# Patient Record
Sex: FEMALE | Race: OTHER | HISPANIC OR LATINO
[De-identification: names, ages, dates, MRNs, and addresses within clinical notes are randomized per-mention and may not be internally consistent; named-entity substitution may affect disease eponyms.]

---

## 2017-01-27 ENCOUNTER — MEDICATION RENEWAL (OUTPATIENT)
Age: 52
End: 2017-01-27

## 2017-01-27 DIAGNOSIS — M54.5 LOW BACK PAIN: ICD-10-CM

## 2017-11-13 ENCOUNTER — APPOINTMENT (OUTPATIENT)
Dept: INTERNAL MEDICINE | Facility: CLINIC | Age: 52
End: 2017-11-13
Payer: COMMERCIAL

## 2017-11-13 PROCEDURE — 99396 PREV VISIT EST AGE 40-64: CPT

## 2018-04-30 ENCOUNTER — APPOINTMENT (OUTPATIENT)
Dept: INTERNAL MEDICINE | Facility: CLINIC | Age: 53
End: 2018-04-30
Payer: COMMERCIAL

## 2018-04-30 DIAGNOSIS — M77.11 LATERAL EPICONDYLITIS, RIGHT ELBOW: ICD-10-CM

## 2018-04-30 PROCEDURE — 99214 OFFICE O/P EST MOD 30 MIN: CPT

## 2018-10-12 ENCOUNTER — APPOINTMENT (OUTPATIENT)
Dept: INTERNAL MEDICINE | Facility: CLINIC | Age: 53
End: 2018-10-12
Payer: SELF-PAY

## 2018-10-12 VITALS — SYSTOLIC BLOOD PRESSURE: 150 MMHG | DIASTOLIC BLOOD PRESSURE: 90 MMHG

## 2018-10-12 VITALS
HEART RATE: 80 BPM | OXYGEN SATURATION: 98 % | WEIGHT: 150 LBS | TEMPERATURE: 98 F | SYSTOLIC BLOOD PRESSURE: 166 MMHG | RESPIRATION RATE: 14 BRPM | DIASTOLIC BLOOD PRESSURE: 92 MMHG

## 2018-10-12 PROCEDURE — 99214 OFFICE O/P EST MOD 30 MIN: CPT

## 2018-10-12 NOTE — HISTORY OF PRESENT ILLNESS
[FreeTextEntry8] : PT HAS BEEN HAVING POSITIONAL VERTIGO FOR A FEW WEEKS WORSE WITH SUDDEN HEAD MOVEMENTS\par ALSO HAS INESSA EMOTIONAL AND HAVING EPISODES OF SOB,PALPATIONS, AND CRYING AFTER CLOSE NEIGHBOR  SUDDENLY 1 M AGO  AND WORSE LAST 7- 10 DAYS;LAST PRAVIN GOT WORSE AND WENT TO ER AT [facility] AFTER HER BP AT HOME /80;WHILE THERE HER BP NORMALIZE,EKG AND CT HEAD WERE NORMAL AND D/C WITH HCTZ 12.5 MG DAILY\par TODAY FEELING TIRED,ANXIOUS,PALPITATIONS ON/OFF AND VERTIGINOUS SENSATION WHEN MOVING HER HEAD

## 2018-10-12 NOTE — PHYSICAL EXAM
[No Acute Distress] : no acute distress [Well Nourished] : well nourished [Well Developed] : well developed [Well-Appearing] : well-appearing [Normal Sclera/Conjunctiva] : normal sclera/conjunctiva [PERRL] : pupils equal round and reactive to light [EOMI] : extraocular movements intact [Normal Outer Ear/Nose] : the outer ears and nose were normal in appearance [Normal Oropharynx] : the oropharynx was normal [No JVD] : no jugular venous distention [Supple] : supple [No Lymphadenopathy] : no lymphadenopathy [Thyroid Normal, No Nodules] : the thyroid was normal and there were no nodules present [No Respiratory Distress] : no respiratory distress  [Clear to Auscultation] : lungs were clear to auscultation bilaterally [No Accessory Muscle Use] : no accessory muscle use [Regular Rhythm] : with a regular rhythm [Normal S1, S2] : normal S1 and S2 [No Murmur] : no murmur heard [No Carotid Bruits] : no carotid bruits [No Abdominal Bruit] : a ~M bruit was not heard ~T in the abdomen [No Varicosities] : no varicosities [Pedal Pulses Present] : the pedal pulses are present [No Edema] : there was no peripheral edema [No Extremity Clubbing/Cyanosis] : no extremity clubbing/cyanosis [No Palpable Aorta] : no palpable aorta [Soft] : abdomen soft [Non Tender] : non-tender [Non-distended] : non-distended [No Masses] : no abdominal mass palpated [No HSM] : no HSM [Normal Bowel Sounds] : normal bowel sounds [Normal Posterior Cervical Nodes] : no posterior cervical lymphadenopathy [Normal Anterior Cervical Nodes] : no anterior cervical lymphadenopathy [No CVA Tenderness] : no CVA  tenderness [No Spinal Tenderness] : no spinal tenderness [No Joint Swelling] : no joint swelling [Grossly Normal Strength/Tone] : grossly normal strength/tone [No Rash] : no rash [Normal Gait] : normal gait [Coordination Grossly Intact] : coordination grossly intact [No Focal Deficits] : no focal deficits [Deep Tendon Reflexes (DTR)] : deep tendon reflexes were 2+ and symmetric [Normal Affect] : the affect was normal [Normal Insight/Judgement] : insight and judgment were intact [Normal Mental Status] : the patient's orientation, memory, attention, language and fund of knowledge were normal [Person] : oriented to person [Place] : oriented to place [Time] : oriented to time [Short Term Intact] : short term memory intact [Span Intact] : the attention span was normal [Concentration Intact] : normal concentrating ability [Naming Objects] : no difficulty naming common objects [Current Events] : adequate knowledge of current events [Appropriate] : appropriate [Anxious] : anxious [Depressed] : depressed [Labile] : labile [Impaired judgment] : intact judgment [Impaired Insight] : intact insight [Normal Rate] : a normal rate [Normal Rhythm] : a normal rhythm [Normal Tone] : normal tone [Normal Volume] : normal volume [Normal] : normal throught processes [Delusions] : exhibited no delusions [Hallucinations] : exhibited no hallucinations [Obsessions] : denied obsessions [Preoccupation with Violence] : denied preoccupation with violent thoughts [Suicidal Ideation] : denied suicidal ideation [Suicidal Intent] : denied suicidal intent [Suicidal Plan] : denied suicidal plans [Homicidal Ideation] : denied homicidal ideation [Homicidal Intent] : denied homicidal intention [Homicidal Plan] : denied homicidal plans

## 2018-10-12 NOTE — REVIEW OF SYSTEMS
[Palpitations] : palpitations [Shortness Of Breath] : shortness of breath [Dizziness] : dizziness [Anxiety] : anxiety [Depression] : depression [Negative] : Heme/Lymph

## 2018-10-12 NOTE — ASSESSMENT
[FreeTextEntry1] : ACUTE VISIT OF 53 Y OLD FEM WHO HAS BEEN HAVING BPPV FOR SEVERAL WEEKS= MECLIZINE 25 MG BID PRN RX SENT,EXPLAINED NATURE OF DISEASE\par DONALDO WITH PALPATIONS= LEXAPRO 10MG DAILY TILL NEXT VISIT ,EXPLAINED IN DETAIL\par PANIC ATTACKS= CLONAZEPAM 0.5 MG EVERY 8 HS PRN ONLY WHILE LEXAPRO WORKS\par HTNSTAGE 1/PALPITATIONS= METOPROLOL 25 MG EVERY 12 HS PRN\par RTO 1-2 WEEKS OR PRN \par TIME OF DIRECT PT CARE 42 MINUTES\par RECORDS FROM HOSPITAL REVIEWED  AND DISCUSSED.\par  PRESENT DURING EXAM

## 2018-11-08 ENCOUNTER — RX RENEWAL (OUTPATIENT)
Age: 53
End: 2018-11-08

## 2018-11-28 ENCOUNTER — APPOINTMENT (OUTPATIENT)
Dept: INTERNAL MEDICINE | Facility: CLINIC | Age: 53
End: 2018-11-28
Payer: COMMERCIAL

## 2018-11-28 ENCOUNTER — NON-APPOINTMENT (OUTPATIENT)
Age: 53
End: 2018-11-28

## 2018-11-28 VITALS — DIASTOLIC BLOOD PRESSURE: 90 MMHG | SYSTOLIC BLOOD PRESSURE: 142 MMHG

## 2018-11-28 VITALS
OXYGEN SATURATION: 96 % | BODY MASS INDEX: 25.61 KG/M2 | WEIGHT: 150 LBS | SYSTOLIC BLOOD PRESSURE: 159 MMHG | HEART RATE: 72 BPM | HEIGHT: 64 IN | DIASTOLIC BLOOD PRESSURE: 89 MMHG | TEMPERATURE: 98 F

## 2018-11-28 PROCEDURE — 93000 ELECTROCARDIOGRAM COMPLETE: CPT

## 2018-11-28 PROCEDURE — 99396 PREV VISIT EST AGE 40-64: CPT | Mod: 25

## 2018-11-28 PROCEDURE — 90686 IIV4 VACC NO PRSV 0.5 ML IM: CPT

## 2018-11-28 PROCEDURE — 90471 IMMUNIZATION ADMIN: CPT

## 2018-11-28 RX ORDER — METOPROLOL TARTRATE 25 MG/1
25 TABLET, FILM COATED ORAL
Qty: 60 | Refills: 0 | Status: DISCONTINUED | COMMUNITY
Start: 2018-10-12 | End: 2018-11-28

## 2018-11-28 RX ORDER — CYCLOBENZAPRINE HYDROCHLORIDE 10 MG/1
10 TABLET, FILM COATED ORAL
Qty: 60 | Refills: 0 | Status: DISCONTINUED | COMMUNITY
Start: 2017-01-27 | End: 2018-11-28

## 2018-11-28 NOTE — ASSESSMENT
[FreeTextEntry1] : CPE OF 53 Y OLD FEM WITH PMX OF HTN STAGE 1,DONALDO,IFG= LABS REVIEWED,CONTINUE LEXAPRO 10 MG DAILY;TAKING KLONOPIN RARELY PRN\par INFLUENZA VACCINE TODAY\par HTN=DIET AND EXERCISE RTO 2 M

## 2019-01-06 ENCOUNTER — RX RENEWAL (OUTPATIENT)
Age: 54
End: 2019-01-06

## 2019-01-07 ENCOUNTER — APPOINTMENT (OUTPATIENT)
Dept: INTERNAL MEDICINE | Facility: CLINIC | Age: 54
End: 2019-01-07
Payer: COMMERCIAL

## 2019-01-07 VITALS
DIASTOLIC BLOOD PRESSURE: 78 MMHG | TEMPERATURE: 98.1 F | BODY MASS INDEX: 25.61 KG/M2 | WEIGHT: 150 LBS | HEIGHT: 64 IN | HEART RATE: 70 BPM | OXYGEN SATURATION: 95 % | SYSTOLIC BLOOD PRESSURE: 136 MMHG

## 2019-01-07 DIAGNOSIS — J01.10 ACUTE FRONTAL SINUSITIS, UNSPECIFIED: ICD-10-CM

## 2019-01-07 DIAGNOSIS — R20.2 PARESTHESIA OF SKIN: ICD-10-CM

## 2019-01-07 PROCEDURE — 99214 OFFICE O/P EST MOD 30 MIN: CPT

## 2019-01-07 NOTE — PHYSICAL EXAM

## 2019-01-07 NOTE — REVIEW OF SYSTEMS
[Nasal Discharge] : nasal discharge [Postnasal Drip] : postnasal drip [Palpitations] : palpitations [Shortness Of Breath] : shortness of breath [Cough] : cough [Anxiety] : anxiety [Negative] : Heme/Lymph [de-identified] : PARESTHESIA OF PLANTAR AREA KENNEDI

## 2019-01-07 NOTE — HISTORY OF PRESENT ILLNESS
[FreeTextEntry8] : PT DEVELOPED URI 1  AGO ,LASTED 3 DAYS AND RESOLVED\par 2-3 WEEKS LATER DEVELOPED PRODUCTIVE COUGH,BECAME PURULENT ,DEVELOPED HA AND SINUS PRESSURE,WENT TO URGENT CARE 4 DASY AGO,HAD NEG CXR AND INFLUENZA POCT TESTING AND RX VENTOLIN ,TESSALON  AND AMOX 875 \par COUGH IS BETTER BUT PURULENT SPUTUM AND PND IS UNCHANGED .\par PT STOPPED LEXAPRO WHEN SHE STARED ATB AND ANXIETY SX RETURNED\par PT HAS BEEN HAVING SENSATION OF WETNESS -SWEATING OF  PLANTAR ASPECT OF BOTH FEET FOR OVER 1 M BUT THEY ARE NORMAL TO TOUCH

## 2019-03-15 ENCOUNTER — RX RENEWAL (OUTPATIENT)
Age: 54
End: 2019-03-15

## 2019-05-27 ENCOUNTER — RX RENEWAL (OUTPATIENT)
Age: 54
End: 2019-05-27

## 2019-06-26 ENCOUNTER — APPOINTMENT (OUTPATIENT)
Dept: INTERNAL MEDICINE | Facility: CLINIC | Age: 54
End: 2019-06-26
Payer: COMMERCIAL

## 2019-06-26 VITALS
DIASTOLIC BLOOD PRESSURE: 77 MMHG | WEIGHT: 150 LBS | SYSTOLIC BLOOD PRESSURE: 150 MMHG | BODY MASS INDEX: 25.61 KG/M2 | TEMPERATURE: 98.4 F | RESPIRATION RATE: 14 BRPM | HEIGHT: 64 IN | OXYGEN SATURATION: 98 % | HEART RATE: 68 BPM

## 2019-06-26 VITALS — SYSTOLIC BLOOD PRESSURE: 130 MMHG | DIASTOLIC BLOOD PRESSURE: 80 MMHG

## 2019-06-26 PROCEDURE — 99214 OFFICE O/P EST MOD 30 MIN: CPT

## 2019-06-26 RX ORDER — LEVOFLOXACIN 500 MG/1
500 TABLET, FILM COATED ORAL
Qty: 10 | Refills: 0 | Status: DISCONTINUED | COMMUNITY
Start: 2019-01-07 | End: 2019-06-26

## 2019-06-26 NOTE — REVIEW OF SYSTEMS
[Abdominal Pain] : abdominal pain [Heartburn] : heartburn [Negative] : Heme/Lymph [FreeTextEntry4] : SEE HPI [FreeTextEntry7] : SEE HPI

## 2019-06-26 NOTE — HISTORY OF PRESENT ILLNESS
[FreeTextEntry8] : PT PRESENTS WITH CC OF PERSISTENT COUGH AFTER URI 1 M AGO ,NO FEVER ,MOSTLY CLEAR MUCUS,DARK ONLY IN AM\par +PND AND SORE THROAT \par NO SOB\par SPORADIC EPIGASTRIC PAIN AND CC OF RUQ PAIN WITH RADIATION TO R MID BACK FOR LAST 3 WEEKS .NL BM GOOD APPETITE

## 2019-06-26 NOTE — ASSESSMENT
[FreeTextEntry1] : ACUTE VISIT OF 54 Y OLD FEM WHO PRESENTS WITH PERSISTENT COUGH POST URI WITH PND= TESSALON PEARLS TID AND ATROVENT NASAL INH\par RUQ ABD PAIN WITH RADIATION TO FLANK= ABD SONO ORDERED\par RTO AS PER RESULTS

## 2019-06-26 NOTE — PHYSICAL EXAM

## 2019-07-19 ENCOUNTER — RX RENEWAL (OUTPATIENT)
Age: 54
End: 2019-07-19

## 2019-07-23 ENCOUNTER — RX RENEWAL (OUTPATIENT)
Age: 54
End: 2019-07-23

## 2019-09-24 ENCOUNTER — APPOINTMENT (OUTPATIENT)
Dept: HEART AND VASCULAR | Facility: CLINIC | Age: 54
End: 2019-09-24
Payer: COMMERCIAL

## 2019-09-24 ENCOUNTER — NON-APPOINTMENT (OUTPATIENT)
Age: 54
End: 2019-09-24

## 2019-09-24 VITALS
TEMPERATURE: 98.2 F | BODY MASS INDEX: 25.78 KG/M2 | HEART RATE: 82 BPM | WEIGHT: 151 LBS | RESPIRATION RATE: 14 BRPM | SYSTOLIC BLOOD PRESSURE: 152 MMHG | OXYGEN SATURATION: 98 % | HEIGHT: 64 IN | DIASTOLIC BLOOD PRESSURE: 90 MMHG

## 2019-09-24 VITALS — HEART RATE: 82 BPM | SYSTOLIC BLOOD PRESSURE: 140 MMHG | DIASTOLIC BLOOD PRESSURE: 82 MMHG

## 2019-09-24 PROCEDURE — 99205 OFFICE O/P NEW HI 60 MIN: CPT

## 2019-09-24 NOTE — ASSESSMENT
[FreeTextEntry1] : 55 yo F with PMH of  anxiety and palpitations here for first evaluation with this provider for exertional angina and palpitations \par \par HTN\par \par HLD\par \par MONAHAN\par \par PALPITATIONS\par \par

## 2019-09-24 NOTE — DISCUSSION/SUMMARY
[FreeTextEntry1] : ANGINA/MONAHAN\par -The patient describes typical exertional symptoms which are relieved by rest\par -In the setting of abnormal EKG would recommend to  risk stratify for CAD with a nuclear stress test\par -echo to r/o WMA and valvular pathology \par \par PALPITATIONS\par -In the setting of increased frequency of symptoms and not always associated with panic attacks, will order Holter monitoring to r/o arrhythmia\par -echo as above\par -nuc stress test as above to r/o CAD\par -CMP and TSH\par \par HTN\par -Borderline high on repeat measurement today\par -Will repeat blood pressure check next visit and if persistently elevated will start antihypertensive\par \par HLD\par -Fasting lipid panel\par \par ABN EKG\par -nuc stress test as above in the setting of symptoms \par -echo \par \par Followup in 3 weeks for nuclear stress result, Holter, echo, labs

## 2019-09-24 NOTE — PHYSICAL EXAM
[General Appearance - Well Developed] : well developed [Normal Appearance] : normal appearance [General Appearance - Well Nourished] : well nourished [Well Groomed] : well groomed [No Deformities] : no deformities [General Appearance - In No Acute Distress] : no acute distress [Heart Rate And Rhythm] : heart rate and rhythm were normal [Heart Sounds] : normal S1 and S2 [Murmurs] : no murmurs present [Respiration, Rhythm And Depth] : normal respiratory rhythm and effort [Exaggerated Use Of Accessory Muscles For Inspiration] : no accessory muscle use [Abdomen Soft] : soft [Auscultation Breath Sounds / Voice Sounds] : lungs were clear to auscultation bilaterally [Abdomen Tenderness] : non-tender [Abdomen Mass (___ Cm)] : no abdominal mass palpated [Abnormal Walk] : normal gait [Gait - Sufficient For Exercise Testing] : the gait was sufficient for exercise testing [Nail Clubbing] : no clubbing of the fingernails [Cyanosis, Localized] : no localized cyanosis [Petechial Hemorrhages (___cm)] : no petechial hemorrhages [] : no ischemic changes

## 2019-09-24 NOTE — HISTORY OF PRESENT ILLNESS
[FreeTextEntry1] : 55 yo F with PMH pre DM, anxiety, palpitations here for first evaluation of palpitations\par The patient reports that in the last year the patient has been experiencing panic attacks and palpitations. \par She reports that she is also started experiencing chest pain on exertion. \par When rushing for taking the bus she report chest pain and palpitations which improved with rest. Also with dizziness. Reports also that her palpitations have increased in frequency and are not a/w with panic attacks.  \par Denies syncope, orthopnea, LE edema. \par \par PMH\par anxiety/panic attacks\par palpitations\par pre DM\par \par ALL\par NKDA\par \par MEDS\par citalopram\par klonazepam\par calcium \par vitamins\par \par FH\par grandfather with MI\par \par SH\par no smoke, no etoh, no drugs\par \par \par EKG 9/24/2019 \par SR, TWI V1-V3\par

## 2019-09-24 NOTE — REVIEW OF SYSTEMS
[Shortness Of Breath] : shortness of breath [Palpitations] : palpitations [Chest Pain] : chest pain [Negative] : Heme/Lymph

## 2019-10-10 ENCOUNTER — OUTPATIENT (OUTPATIENT)
Dept: OUTPATIENT SERVICES | Facility: HOSPITAL | Age: 54
LOS: 1 days | End: 2019-10-10
Payer: COMMERCIAL

## 2019-10-10 DIAGNOSIS — R00.2 PALPITATIONS: ICD-10-CM

## 2019-10-10 PROCEDURE — 93018 CV STRESS TEST I&R ONLY: CPT

## 2019-10-10 PROCEDURE — A9505: CPT

## 2019-10-10 PROCEDURE — A9500: CPT

## 2019-10-10 PROCEDURE — 78452 HT MUSCLE IMAGE SPECT MULT: CPT | Mod: 26

## 2019-10-10 PROCEDURE — 93016 CV STRESS TEST SUPVJ ONLY: CPT

## 2019-10-10 PROCEDURE — 93017 CV STRESS TEST TRACING ONLY: CPT

## 2019-10-10 PROCEDURE — 78452 HT MUSCLE IMAGE SPECT MULT: CPT

## 2019-10-17 ENCOUNTER — APPOINTMENT (OUTPATIENT)
Dept: HEART AND VASCULAR | Facility: CLINIC | Age: 54
End: 2019-10-17
Payer: COMMERCIAL

## 2019-10-17 VITALS
WEIGHT: 150 LBS | RESPIRATION RATE: 14 BRPM | TEMPERATURE: 98.6 F | SYSTOLIC BLOOD PRESSURE: 136 MMHG | HEIGHT: 64 IN | BODY MASS INDEX: 25.61 KG/M2 | DIASTOLIC BLOOD PRESSURE: 84 MMHG | HEART RATE: 67 BPM | OXYGEN SATURATION: 97 %

## 2019-10-17 PROCEDURE — 99215 OFFICE O/P EST HI 40 MIN: CPT

## 2019-10-17 NOTE — PHYSICAL EXAM
[Normal Appearance] : normal appearance [General Appearance - Well Developed] : well developed [Well Groomed] : well groomed [General Appearance - Well Nourished] : well nourished [No Deformities] : no deformities [General Appearance - In No Acute Distress] : no acute distress [Heart Rate And Rhythm] : heart rate and rhythm were normal [Heart Sounds] : normal S1 and S2 [Murmurs] : no murmurs present [Respiration, Rhythm And Depth] : normal respiratory rhythm and effort [Exaggerated Use Of Accessory Muscles For Inspiration] : no accessory muscle use [Auscultation Breath Sounds / Voice Sounds] : lungs were clear to auscultation bilaterally [Abdomen Soft] : soft [Abdomen Tenderness] : non-tender [Abdomen Mass (___ Cm)] : no abdominal mass palpated [Abnormal Walk] : normal gait [Gait - Sufficient For Exercise Testing] : the gait was sufficient for exercise testing [Nail Clubbing] : no clubbing of the fingernails [Cyanosis, Localized] : no localized cyanosis [Petechial Hemorrhages (___cm)] : no petechial hemorrhages [] : no ischemic changes

## 2019-10-22 NOTE — DISCUSSION/SUMMARY
[FreeTextEntry1] : 53 yo F with PMH pre DM, anxiety, palpitations here for followup.\par \par PALPITATIONS\par -Holter shows evidence of sinus tachycardia an episode of short runs of supraventricular tachycardia.\par -will order Toprol XL 25 mg daily \par -echo wnl\par -nuc stress test normal\par -CMP and TSH wnl\par \par CHEST PAIN\par -atypical \par -negative stress test \par -normal echo\par -recc continuing with work up of non cardiac etiologies of chest pain\par \par HTN\par -better controlled\par -will start toprol XL as above\par -recc to check BP at home and report any signs of orthostasis\par \par HLD\par -Fasting lipid panel reviewed. Recc healthy diet and exercise \par -repeat lipid panel in 4 months \par \par \par Followup in one month\par Recommended to call the clinic immediately if worsening of chest pain and or palpitations

## 2019-10-22 NOTE — HISTORY OF PRESENT ILLNESS
[FreeTextEntry1] : 55 yo F with PMH pre DM, anxiety, palpitations here for followup.\par She reports it being under a lot of stress recently. Has undergone a family loss (father  last week). \par Reports process of palpitations, lasting for a few seconds, self resolving.\par She reports a pin-point  pain at the level of the left rib. Reports pain is aggravated by moving her left arm and has been present for more than 2 years.\par \par Holter monitor reviewed. Evidence of sinus tachycardia an episode of short runs of supraventricular tachycardia.\par Nuclear stress test reviewed.\par \par \par As per previous note\par 55 yo F with PMH pre DM, anxiety, palpitations here for first evaluation of palpitations\par The patient reports that in the last year the patient has been experiencing panic attacks and palpitations. \par She reports that she is also started experiencing chest pain on exertion. \par When rushing for taking the bus she report chest pain and palpitations which improved with rest. Also with dizziness. Reports also that her palpitations have increased in frequency and are not a/w with panic attacks.  \par Denies syncope, orthopnea, LE edema. \par \par PMH\par anxiety/panic attacks\par palpitations\par pre DM\par \par ALL\par NKDA\par \par MEDS\par citalopram\par klonazepam\par calcium \par vitamins\par \par FH\par grandfather with MI\par \par SH\par no smoke, no etoh, no drugs\par \par \par EKG 2019 \par SR, TWI V1-V3\par

## 2019-10-30 ENCOUNTER — APPOINTMENT (OUTPATIENT)
Dept: INTERNAL MEDICINE | Facility: CLINIC | Age: 54
End: 2019-10-30
Payer: COMMERCIAL

## 2019-10-30 VITALS — DIASTOLIC BLOOD PRESSURE: 85 MMHG | SYSTOLIC BLOOD PRESSURE: 145 MMHG

## 2019-10-30 VITALS
HEIGHT: 64 IN | OXYGEN SATURATION: 99 % | BODY MASS INDEX: 25.61 KG/M2 | RESPIRATION RATE: 14 BRPM | HEART RATE: 81 BPM | SYSTOLIC BLOOD PRESSURE: 154 MMHG | DIASTOLIC BLOOD PRESSURE: 92 MMHG | WEIGHT: 150 LBS | TEMPERATURE: 98.6 F

## 2019-10-30 PROCEDURE — 90674 CCIIV4 VAC NO PRSV 0.5 ML IM: CPT

## 2019-10-30 PROCEDURE — 99214 OFFICE O/P EST MOD 30 MIN: CPT

## 2019-10-30 PROCEDURE — 90471 IMMUNIZATION ADMIN: CPT

## 2019-10-30 NOTE — REVIEW OF SYSTEMS
[Fatigue] : fatigue [Chest Pain] : chest pain [Palpitations] : palpitations [Muscle Pain] : muscle pain [Back Pain] : back pain [Headache] : headache [Dizziness] : dizziness [Insomnia] : insomnia [Anxiety] : anxiety [Depression] : depression [Negative] : Heme/Lymph

## 2019-10-30 NOTE — HISTORY OF PRESENT ILLNESS
[de-identified] : PT COMES WITH MULTIPLE COMPLAINS\par HAD STOP LEXAPRO DUE TO PALPITATIONS AND DIZZINESS\par SEEN BY CARDIOLOGY FOR ATYP CHEST PAIN AND W/U NEG\par WAS RX METOPROLOL FOR PALPITATIONS AND SINUS TACH BUT NOT TAKING IT \par LEFT SIDE UPPER BACK PAIN  AND CHEST PAIN CONTINUES AND ALTERS HER SLEEP \par FEELING ANXIOUS AND DEPRESSED ON /OFF

## 2019-10-30 NOTE — ASSESSMENT
[FreeTextEntry1] : 54 Y OLD FEM WITH PMX OF DONALDO = TRIAL OF EFFEXOR 37.5 MG DAILY\par MUSCLE SPASM OF UPPER BACK AND CHEST= FLEXERIL 10 MG AT HS PRN \par INFLUENZA VACCINE TODAY

## 2019-11-14 ENCOUNTER — APPOINTMENT (OUTPATIENT)
Dept: HEART AND VASCULAR | Facility: CLINIC | Age: 54
End: 2019-11-14
Payer: COMMERCIAL

## 2019-11-14 VITALS
SYSTOLIC BLOOD PRESSURE: 146 MMHG | BODY MASS INDEX: 25.61 KG/M2 | OXYGEN SATURATION: 99 % | WEIGHT: 150 LBS | DIASTOLIC BLOOD PRESSURE: 89 MMHG | RESPIRATION RATE: 14 BRPM | TEMPERATURE: 98.1 F | HEART RATE: 80 BPM | HEIGHT: 64 IN

## 2019-11-14 LAB
ALBUMIN SERPL ELPH-MCNC: 4.9 G/DL
ALP BLD-CCNC: 113 U/L
ALT SERPL-CCNC: 18 U/L
ANION GAP SERPL CALC-SCNC: 15 MMOL/L
AST SERPL-CCNC: 19 U/L
BILIRUB SERPL-MCNC: 0.6 MG/DL
BUN SERPL-MCNC: 12 MG/DL
CALCIUM SERPL-MCNC: 9.7 MG/DL
CHLORIDE SERPL-SCNC: 103 MMOL/L
CHOLEST SERPL-MCNC: 208 MG/DL
CHOLEST/HDLC SERPL: 4.3 RATIO
CO2 SERPL-SCNC: 23 MMOL/L
CREAT SERPL-MCNC: 0.59 MG/DL
GLUCOSE SERPL-MCNC: 110 MG/DL
HDLC SERPL-MCNC: 48 MG/DL
LDLC SERPL CALC-MCNC: 139 MG/DL
POTASSIUM SERPL-SCNC: 4.3 MMOL/L
PROT SERPL-MCNC: 7.4 G/DL
SODIUM SERPL-SCNC: 141 MMOL/L
TRIGL SERPL-MCNC: 107 MG/DL
TSH SERPL-ACNC: 1.41 UIU/ML

## 2019-11-14 PROCEDURE — 99215 OFFICE O/P EST HI 40 MIN: CPT

## 2019-11-14 NOTE — DISCUSSION/SUMMARY
[FreeTextEntry1] : 55 yo F with PMH pre DM, anxiety, palpitations here for followup.\par \par PALPITATIONS\par -Holter shows evidence of sinus tachycardia an episode of short runs of supraventricular tachycardia.\par -recommended to take Toprol XL 25 mg daily, as it also could help with BP\par -recc to monitor for palpitations  \par -echo wnl\par -nuc stress test normal\par -CMP and TSH wnl\par \par CHEST PAIN\par -resolved\par -atypical \par -negative stress test \par -normal echo\par -recc continuing with work up of non cardiac etiologies of chest pain\par \par HTN\par -not optimally controlled\par -will start toprol XL as above, recc to check BP at home and to keep BP log\par -will likely start second agent if BP not controlled at next visit \par -recc to check BP at home and report any signs of orthostasis\par \par HLD\par -Fasting lipid panel reviewed. Recc healthy diet and exercise \par -repeat lipid panel at next visit \par \par \par Followup in one month\par Recommended to call the clinic immediately if worsening of chest pain and or palpitations

## 2019-11-14 NOTE — PHYSICAL EXAM
[General Appearance - Well Developed] : well developed [Normal Appearance] : normal appearance [Well Groomed] : well groomed [General Appearance - Well Nourished] : well nourished [No Deformities] : no deformities [General Appearance - In No Acute Distress] : no acute distress [Heart Rate And Rhythm] : heart rate and rhythm were normal [Heart Sounds] : normal S1 and S2 [Murmurs] : no murmurs present [Respiration, Rhythm And Depth] : normal respiratory rhythm and effort [Exaggerated Use Of Accessory Muscles For Inspiration] : no accessory muscle use [Auscultation Breath Sounds / Voice Sounds] : lungs were clear to auscultation bilaterally [Abdomen Soft] : soft [Abdomen Tenderness] : non-tender [Abdomen Mass (___ Cm)] : no abdominal mass palpated [Abnormal Walk] : normal gait [Gait - Sufficient For Exercise Testing] : the gait was sufficient for exercise testing [Nail Clubbing] : no clubbing of the fingernails [Petechial Hemorrhages (___cm)] : no petechial hemorrhages [Cyanosis, Localized] : no localized cyanosis [] : no ischemic changes

## 2019-11-14 NOTE — HISTORY OF PRESENT ILLNESS
[FreeTextEntry1] : 55 yo F with PMH pre DM, anxiety, palpitations here for followup.\par The patient reports that she has not being taking Toprol as prescribed. (never took it). She thinks that her palpitations are associated with anxiety and partially to citalopram which she also discontinued. \par The patient denies chest pain, sob, syncope. \par Reports that her palpitations are a/w anxiety. \par Recently at home her BP has been in the 150s. She is not taking any BP meds. \par \par Holter monitor reviewed. Evidence of sinus tachycardia an episode of short runs of supraventricular tachycardia.\par Nuclear stress test reviewed.\par \par \par \par PMH\par anxiety/panic attacks\par palpitations\par pre DM\par \par ALL\par NKDA\par \par MEDS\par citalopram\par klonazepam\par calcium \par vitamins\par \par FH\par grandfather with MI\par \par SH\par no smoke, no etoh, no drugs\par \par \par EKG 9/24/2019 \par SR, TWI V1-V3\par

## 2019-11-18 ENCOUNTER — APPOINTMENT (OUTPATIENT)
Dept: INTERNAL MEDICINE | Facility: CLINIC | Age: 54
End: 2019-11-18
Payer: COMMERCIAL

## 2019-11-18 ENCOUNTER — NON-APPOINTMENT (OUTPATIENT)
Age: 54
End: 2019-11-18

## 2019-11-18 VITALS
TEMPERATURE: 98.1 F | OXYGEN SATURATION: 98 % | RESPIRATION RATE: 14 BRPM | DIASTOLIC BLOOD PRESSURE: 88 MMHG | SYSTOLIC BLOOD PRESSURE: 144 MMHG | HEIGHT: 64 IN | HEART RATE: 92 BPM | WEIGHT: 150 LBS | BODY MASS INDEX: 25.61 KG/M2

## 2019-11-18 PROCEDURE — 99214 OFFICE O/P EST MOD 30 MIN: CPT

## 2019-11-18 RX ORDER — DIAPER,BRIEF,INFANT-TODD,DISP
500-400 EACH MISCELLANEOUS
Qty: 360 | Refills: 1 | Status: DISCONTINUED | COMMUNITY
Start: 2018-04-30 | End: 2019-11-18

## 2019-11-18 RX ORDER — MECLIZINE HYDROCHLORIDE 25 MG/1
25 TABLET ORAL
Qty: 60 | Refills: 0 | Status: DISCONTINUED | COMMUNITY
Start: 2018-10-12 | End: 2019-11-18

## 2019-11-18 RX ORDER — BENZONATATE 200 MG/1
200 CAPSULE ORAL
Qty: 21 | Refills: 0 | Status: DISCONTINUED | COMMUNITY
Start: 2019-06-26 | End: 2019-11-18

## 2019-11-18 RX ORDER — MELOXICAM 15 MG/1
15 TABLET ORAL DAILY
Qty: 30 | Refills: 0 | Status: DISCONTINUED | COMMUNITY
Start: 2017-01-27 | End: 2019-11-18

## 2019-11-18 RX ORDER — IPRATROPIUM BROMIDE 42 UG/1
0.06 SPRAY NASAL 3 TIMES DAILY
Qty: 1 | Refills: 0 | Status: DISCONTINUED | COMMUNITY
Start: 2019-06-26 | End: 2019-11-18

## 2019-11-18 RX ORDER — ESCITALOPRAM OXALATE 10 MG/1
10 TABLET ORAL
Qty: 30 | Refills: 1 | Status: DISCONTINUED | COMMUNITY
Start: 2018-10-12 | End: 2019-11-18

## 2019-11-18 NOTE — ASSESSMENT
[FreeTextEntry1] : ACUTE VISIT OF 54 Y OLD FEM WITH DONALDO AND INSOMNIA= START EFFEXOR XR 37.5 MG ,START KLONOPIN 0.5 MG AT HS ;TO SEE PSYCH\par HTN AND PALPITATIONS = TOPROL 25 MG DAILY \par F/U CARDIOLOGY

## 2019-11-18 NOTE — HISTORY OF PRESENT ILLNESS
[de-identified] : PT SEEN AT ER OF [facility] 11/14 EVENING WITH ELEVATED BP AND ANXIETY\par D/C ON NO MEDS\par PT HAS NOT BEEN TAKING EFFEXOR OF METOPROLOL  ,LATER JUST START IT 11/14 \par HAS DEAN TO F/U CARDIOLOGY AND TO SEE PSYCH AS WELL\par UNABLE TO SLEEP ,TAKING TEA WITH NO HELP

## 2019-11-18 NOTE — PHYSICAL EXAM
[Normal] : normal gait, coordination grossly intact, no focal deficits and deep tendon reflexes were 2+ and symmetric [Normal Mental Status] : the patient's orientation, memory, attention, language and fund of knowledge were normal [Anxious] : anxious [Labile] : labile [Impaired judgment] : intact judgment [Impaired Insight] : intact insight [Delusions] : exhibited no delusions [Hallucinations] : exhibited no hallucinations [Obsessions] : denied obsessions [Preoccupation with Violence] : denied preoccupation with violent thoughts [Suicidal Ideation] : denied suicidal ideation [Suicidal Intent] : denied suicidal intent [Suicidal Plan] : denied suicidal plans [Homicidal Ideation] : denied homicidal ideation [Homicidal Intent] : denied homicidal intention [Homicidal Plan] : denied homicidal plans

## 2019-11-18 NOTE — REVIEW OF SYSTEMS
[Fatigue] : fatigue [Night Sweats] : night sweats [Chest Pain] : chest pain [Palpitations] : palpitations [Insomnia] : insomnia [Anxiety] : anxiety [Depression] : depression [Negative] : Heme/Lymph

## 2019-11-19 ENCOUNTER — APPOINTMENT (OUTPATIENT)
Dept: HEART AND VASCULAR | Facility: CLINIC | Age: 54
End: 2019-11-19

## 2019-12-02 ENCOUNTER — APPOINTMENT (OUTPATIENT)
Dept: INTERNAL MEDICINE | Facility: CLINIC | Age: 54
End: 2019-12-02
Payer: COMMERCIAL

## 2019-12-02 VITALS
WEIGHT: 149 LBS | SYSTOLIC BLOOD PRESSURE: 139 MMHG | TEMPERATURE: 98.2 F | HEIGHT: 64 IN | RESPIRATION RATE: 15 BRPM | BODY MASS INDEX: 25.44 KG/M2 | OXYGEN SATURATION: 97 % | HEART RATE: 89 BPM | DIASTOLIC BLOOD PRESSURE: 82 MMHG

## 2019-12-02 LAB
25(OH)D3 SERPL-MCNC: 25.7 NG/ML
ALBUMIN SERPL ELPH-MCNC: 4.4 G/DL
ALP BLD-CCNC: 102 U/L
ALT SERPL-CCNC: 18 U/L
ANION GAP SERPL CALC-SCNC: 12 MMOL/L
AST SERPL-CCNC: 18 U/L
BILIRUB SERPL-MCNC: 0.4 MG/DL
BUN SERPL-MCNC: 12 MG/DL
CALCIUM SERPL-MCNC: 9.3 MG/DL
CHLORIDE SERPL-SCNC: 106 MMOL/L
CHOLEST SERPL-MCNC: 186 MG/DL
CHOLEST/HDLC SERPL: 4.2 RATIO
CO2 SERPL-SCNC: 23 MMOL/L
CREAT SERPL-MCNC: 0.63 MG/DL
GLUCOSE SERPL-MCNC: 99 MG/DL
HDLC SERPL-MCNC: 44 MG/DL
LDLC SERPL CALC-MCNC: 118 MG/DL
POTASSIUM SERPL-SCNC: 4.3 MMOL/L
PROT SERPL-MCNC: 7.3 G/DL
SODIUM SERPL-SCNC: 141 MMOL/L
TRIGL SERPL-MCNC: 122 MG/DL

## 2019-12-02 PROCEDURE — 99396 PREV VISIT EST AGE 40-64: CPT

## 2019-12-02 RX ORDER — CYCLOBENZAPRINE HYDROCHLORIDE 10 MG/1
10 TABLET, FILM COATED ORAL
Qty: 20 | Refills: 0 | Status: DISCONTINUED | COMMUNITY
Start: 2019-10-30 | End: 2019-12-02

## 2019-12-02 RX ORDER — PANTOPRAZOLE 40 MG/1
40 TABLET, DELAYED RELEASE ORAL DAILY
Qty: 30 | Refills: 0 | Status: DISCONTINUED | COMMUNITY
Start: 2019-06-26 | End: 2019-12-02

## 2019-12-02 RX ORDER — CLONAZEPAM 0.5 MG/1
0.5 TABLET ORAL AT BEDTIME
Qty: 20 | Refills: 0 | Status: DISCONTINUED | COMMUNITY
Start: 2018-10-12 | End: 2019-12-02

## 2019-12-02 NOTE — PHYSICAL EXAM
[No Acute Distress] : no acute distress [Well Nourished] : well nourished [Well-Appearing] : well-appearing [Well Developed] : well developed [Normal Sclera/Conjunctiva] : normal sclera/conjunctiva [PERRL] : pupils equal round and reactive to light [EOMI] : extraocular movements intact [Normal Outer Ear/Nose] : the outer ears and nose were normal in appearance [No JVD] : no jugular venous distention [Normal Oropharynx] : the oropharynx was normal [No Lymphadenopathy] : no lymphadenopathy [Supple] : supple [Thyroid Normal, No Nodules] : the thyroid was normal and there were no nodules present [No Respiratory Distress] : no respiratory distress  [No Accessory Muscle Use] : no accessory muscle use [Clear to Auscultation] : lungs were clear to auscultation bilaterally [Normal Rate] : normal rate  [Normal S1, S2] : normal S1 and S2 [Regular Rhythm] : with a regular rhythm [No Carotid Bruits] : no carotid bruits [No Murmur] : no murmur heard [No Abdominal Bruit] : a ~M bruit was not heard ~T in the abdomen [Pedal Pulses Present] : the pedal pulses are present [No Varicosities] : no varicosities [No Edema] : there was no peripheral edema [No Palpable Aorta] : no palpable aorta [No Extremity Clubbing/Cyanosis] : no extremity clubbing/cyanosis [Soft] : abdomen soft [Non Tender] : non-tender [Non-distended] : non-distended [No Masses] : no abdominal mass palpated [No HSM] : no HSM [Normal Bowel Sounds] : normal bowel sounds [Normal Posterior Cervical Nodes] : no posterior cervical lymphadenopathy [Normal Anterior Cervical Nodes] : no anterior cervical lymphadenopathy [No Spinal Tenderness] : no spinal tenderness [No CVA Tenderness] : no CVA  tenderness [No Joint Swelling] : no joint swelling [Grossly Normal Strength/Tone] : grossly normal strength/tone [No Rash] : no rash [Coordination Grossly Intact] : coordination grossly intact [No Focal Deficits] : no focal deficits [Normal Gait] : normal gait [Deep Tendon Reflexes (DTR)] : deep tendon reflexes were 2+ and symmetric [Normal Affect] : the affect was normal [Normal Insight/Judgement] : insight and judgment were intact

## 2019-12-02 NOTE — HISTORY OF PRESENT ILLNESS
[de-identified] : PT COMES FOR CPE\par SEEN BY PSYCH LAST WEEK ,FEELING BETTER ON VENLAFAXINE 37.5 MG DAILY\par TAKING METOPROLOL 25 MG WITH LESS PALPITATIONS AND NL BP READING

## 2019-12-09 LAB
BASOPHILS # BLD AUTO: 0.05 K/UL
BASOPHILS NFR BLD AUTO: 0.7 %
EOSINOPHIL # BLD AUTO: 0.13 K/UL
EOSINOPHIL NFR BLD AUTO: 1.9 %
HCT VFR BLD CALC: 39.3 %
HGB BLD-MCNC: 12.8 G/DL
IMM GRANULOCYTES NFR BLD AUTO: 0.3 %
LYMPHOCYTES # BLD AUTO: 2.14 K/UL
LYMPHOCYTES NFR BLD AUTO: 31.2 %
MAN DIFF?: NORMAL
MCHC RBC-ENTMCNC: 31.7 PG
MCHC RBC-ENTMCNC: 32.6 GM/DL
MCV RBC AUTO: 97.3 FL
MONOCYTES # BLD AUTO: 0.38 K/UL
MONOCYTES NFR BLD AUTO: 5.5 %
NEUTROPHILS # BLD AUTO: 4.14 K/UL
NEUTROPHILS NFR BLD AUTO: 60.4 %
PLATELET # BLD AUTO: 288 K/UL
RBC # BLD: 4.04 M/UL
RBC # FLD: 13 %
TSH SERPL-ACNC: 0.85 UIU/ML
WBC # FLD AUTO: 6.86 K/UL

## 2019-12-26 ENCOUNTER — APPOINTMENT (OUTPATIENT)
Dept: HEART AND VASCULAR | Facility: CLINIC | Age: 54
End: 2019-12-26
Payer: COMMERCIAL

## 2019-12-26 VITALS
TEMPERATURE: 98.2 F | RESPIRATION RATE: 15 BRPM | BODY MASS INDEX: 25.78 KG/M2 | DIASTOLIC BLOOD PRESSURE: 81 MMHG | HEIGHT: 64 IN | SYSTOLIC BLOOD PRESSURE: 126 MMHG | WEIGHT: 151 LBS | OXYGEN SATURATION: 97 % | HEART RATE: 60 BPM

## 2019-12-26 PROCEDURE — 99215 OFFICE O/P EST HI 40 MIN: CPT

## 2019-12-26 NOTE — DISCUSSION/SUMMARY
[FreeTextEntry1] : 55 yo F with PMH pre DM, anxiety, palpitations here for followup.\par \par PALPITATIONS\par resolved after starting betablocker \par -Holter shows evidence of sinus tachycardia, one episode of short runs of supraventricular tachycardia.\par -recommended to continue to  take Toprol XL 25 mg daily \par -recc to monitor for palpitations  \par -echo wnl\par -nuc stress test normal\par -CMP and TSH wnl\par \par CHEST PAIN\par -resolved\par -atypical \par -negative stress test \par -normal echo\par -recc continuing with work up of non cardiac etiologies of chest pain\par \par HTN\par -well controlled today \par -continue with toprol XL 25 mg daily  \par -recc to check BP at home and report any signs of orthostasis\par \par HLD\par -Fasting lipid panel reviewed. \par -Recc healthy diet and exercise \par -repeat lipid panel at next visit \par \par Follow up in 4 months or sooner if any new symptoms

## 2019-12-26 NOTE — HISTORY OF PRESENT ILLNESS
[FreeTextEntry1] : 53 yo F with PMH pre DM, anxiety, palpitations here for followup.\par The patient reports significant improvement of her palpitations after starting toprol XL 25 mg daily and changing anti-depressant therapy\par She reports feeling fine with unlimited exercise tolerance (reports yesterday she walked for 60 blocks) Denies any more palpitations.\par The patient denies chest pain, sob, syncope, presyncope. \par \par \par PMH\par anxiety/panic attacks\par palpitations\par pre DM\par \par ALL\par NKDA\par \par MEDS\par toprol XL 25 mg daily \par velaflaxine \par calcium \par vitamins\par \par FH\par grandfather with MI\par \par SH\par no smoke, no etoh, no drugs\par \par \par EKG 9/24/2019 \par SR, TWI V1-V3\par

## 2019-12-26 NOTE — PHYSICAL EXAM
[Normal Appearance] : normal appearance [General Appearance - Well Developed] : well developed [General Appearance - Well Nourished] : well nourished [Well Groomed] : well groomed [No Deformities] : no deformities [Heart Rate And Rhythm] : heart rate and rhythm were normal [General Appearance - In No Acute Distress] : no acute distress [Murmurs] : no murmurs present [Heart Sounds] : normal S1 and S2 [Respiration, Rhythm And Depth] : normal respiratory rhythm and effort [Exaggerated Use Of Accessory Muscles For Inspiration] : no accessory muscle use [Auscultation Breath Sounds / Voice Sounds] : lungs were clear to auscultation bilaterally [Abdomen Tenderness] : non-tender [Abdomen Soft] : soft [Abdomen Mass (___ Cm)] : no abdominal mass palpated [Abnormal Walk] : normal gait [Gait - Sufficient For Exercise Testing] : the gait was sufficient for exercise testing [Nail Clubbing] : no clubbing of the fingernails [] : no ischemic changes [Petechial Hemorrhages (___cm)] : no petechial hemorrhages [Cyanosis, Localized] : no localized cyanosis

## 2020-06-11 ENCOUNTER — RX RENEWAL (OUTPATIENT)
Age: 55
End: 2020-06-11

## 2020-07-13 ENCOUNTER — RX RENEWAL (OUTPATIENT)
Age: 55
End: 2020-07-13

## 2020-07-14 ENCOUNTER — RX RENEWAL (OUTPATIENT)
Age: 55
End: 2020-07-14

## 2020-07-23 ENCOUNTER — RX RENEWAL (OUTPATIENT)
Age: 55
End: 2020-07-23

## 2020-08-17 ENCOUNTER — RX RENEWAL (OUTPATIENT)
Age: 55
End: 2020-08-17

## 2020-09-01 ENCOUNTER — APPOINTMENT (OUTPATIENT)
Dept: HEART AND VASCULAR | Facility: CLINIC | Age: 55
End: 2020-09-01
Payer: COMMERCIAL

## 2020-09-01 ENCOUNTER — NON-APPOINTMENT (OUTPATIENT)
Age: 55
End: 2020-09-01

## 2020-09-01 VITALS
BODY MASS INDEX: 26.46 KG/M2 | OXYGEN SATURATION: 98 % | SYSTOLIC BLOOD PRESSURE: 131 MMHG | TEMPERATURE: 98.3 F | HEART RATE: 56 BPM | WEIGHT: 155 LBS | DIASTOLIC BLOOD PRESSURE: 80 MMHG | RESPIRATION RATE: 14 BRPM | HEIGHT: 64 IN

## 2020-09-01 PROCEDURE — 99214 OFFICE O/P EST MOD 30 MIN: CPT

## 2020-09-01 NOTE — PHYSICAL EXAM
[General Appearance - Well Developed] : well developed [Normal Appearance] : normal appearance [Well Groomed] : well groomed [General Appearance - Well Nourished] : well nourished [No Deformities] : no deformities [General Appearance - In No Acute Distress] : no acute distress [Heart Rate And Rhythm] : heart rate and rhythm were normal [Heart Sounds] : normal S1 and S2 [Murmurs] : no murmurs present [Respiration, Rhythm And Depth] : normal respiratory rhythm and effort [Exaggerated Use Of Accessory Muscles For Inspiration] : no accessory muscle use [Auscultation Breath Sounds / Voice Sounds] : lungs were clear to auscultation bilaterally [Abdomen Soft] : soft [Abdomen Tenderness] : non-tender [Abdomen Mass (___ Cm)] : no abdominal mass palpated [Abnormal Walk] : normal gait [Gait - Sufficient For Exercise Testing] : the gait was sufficient for exercise testing [Nail Clubbing] : no clubbing of the fingernails [Cyanosis, Localized] : no localized cyanosis [Petechial Hemorrhages (___cm)] : no petechial hemorrhages [] : no ischemic changes

## 2020-09-01 NOTE — DISCUSSION/SUMMARY
[FreeTextEntry1] : 56 yo F with PMH pre DM, anxiety, palpitations here for followup.\par \par PALPITATIONS\par resolved after starting betablocker \par -Holter shows evidence of sinus tachycardia, one episode of short runs of supraventricular tachycardia.\par -recommended to continue to  take Toprol XL 25 mg daily \par -recc to monitor for palpitations  \par -echo wnl\par -nuc stress test normal\par -CMP and TSH wnl\par \par CHEST PAIN\par -resolved\par -atypical \par -negative stress test \par -normal echo\par -recc continuing with work up of non cardiac etiologies of chest pain\par \par HTN\par -well controlled toady and on BP log \par -continue with toprol XL 25 mg daily  \par -recc to check BP at home and report any signs of orthostasis\par \par HLD\par -Fasting lipid panel reviewed. \par -Recc healthy diet and exercise \par -repeat lipid panel at next visit \par \par Follow up in 3 months or sooner if any new symptoms

## 2020-09-01 NOTE — HISTORY OF PRESENT ILLNESS
[FreeTextEntry1] : 54 yo F with PMH pre DM, anxiety, palpitations here for followup.\par The patient reports significant improvement of her palpitations after starting toprol XL 25 mg daily \par Today she brings a BP log with BP readings all in the range 120-130/60-80s\par She reports feeling fine with unlimited exercise tolerance even though she is now walking less in the setting of the COVID-19 pandemic \par The patient denies chest pain, sob, syncope, presyncope. \par \par \par PMH\par anxiety/panic attacks\par palpitations\par pre DM\par \par ALL\par NKDA\par \par MEDS\par toprol XL 25 mg daily \par velaflaxine \par calcium \par vitamins\par \par FH\par grandfather with MI\par \par SH\par no smoke, no etoh, no drugs\par \par \par EKG 9/24/2019 \par SR, TWI V1-V3\par \par NUC stress test 10/10/2019 negative for ischemia , normal LVEF\par

## 2020-09-09 ENCOUNTER — APPOINTMENT (OUTPATIENT)
Dept: INTERNAL MEDICINE | Facility: CLINIC | Age: 55
End: 2020-09-09
Payer: COMMERCIAL

## 2020-09-09 VITALS
DIASTOLIC BLOOD PRESSURE: 80 MMHG | OXYGEN SATURATION: 97 % | WEIGHT: 155 LBS | HEIGHT: 64 IN | BODY MASS INDEX: 26.46 KG/M2 | TEMPERATURE: 98 F | HEART RATE: 66 BPM | RESPIRATION RATE: 14 BRPM | SYSTOLIC BLOOD PRESSURE: 136 MMHG

## 2020-09-09 PROCEDURE — 99215 OFFICE O/P EST HI 40 MIN: CPT

## 2020-09-09 NOTE — HISTORY OF PRESENT ILLNESS
[FreeTextEntry8] : PT COMES WITH CC OF WORSENING VERTIGO YESTERDAY ALONG WITH NAUSEA AND HA\par THIS HAS BEEN HAPPENING FOR LAST 4 M \par ALSO HAS BEEN HAVING INCREASED INSOMNIA ;SPOKE WITH PSYCH AND WAS RECOMM MELATONIN \par ALSO CC OF PALPITATIONS ON /OFF \par WHEN SHE GETS VERTIGO NEEDS TO GO TO BED AND SLEEP

## 2020-09-09 NOTE — PHYSICAL EXAM
[No Acute Distress] : no acute distress [Well Nourished] : well nourished [Well-Appearing] : well-appearing [Well Developed] : well developed [PERRL] : pupils equal round and reactive to light [Normal Sclera/Conjunctiva] : normal sclera/conjunctiva [EOMI] : extraocular movements intact [No JVD] : no jugular venous distention [No Lymphadenopathy] : no lymphadenopathy [Supple] : supple [Thyroid Normal, No Nodules] : the thyroid was normal and there were no nodules present [No Respiratory Distress] : no respiratory distress  [Clear to Auscultation] : lungs were clear to auscultation bilaterally [No Accessory Muscle Use] : no accessory muscle use [Regular Rhythm] : with a regular rhythm [Normal S1, S2] : normal S1 and S2 [Normal Rate] : normal rate  [No Murmur] : no murmur heard [No Carotid Bruits] : no carotid bruits [No Varicosities] : no varicosities [No Abdominal Bruit] : a ~M bruit was not heard ~T in the abdomen [No Palpable Aorta] : no palpable aorta [No Edema] : there was no peripheral edema [Pedal Pulses Present] : the pedal pulses are present [No Extremity Clubbing/Cyanosis] : no extremity clubbing/cyanosis [Soft] : abdomen soft [Non Tender] : non-tender [Non-distended] : non-distended [No HSM] : no HSM [Normal Bowel Sounds] : normal bowel sounds [No Masses] : no abdominal mass palpated [Normal Posterior Cervical Nodes] : no posterior cervical lymphadenopathy [No CVA Tenderness] : no CVA  tenderness [Normal Anterior Cervical Nodes] : no anterior cervical lymphadenopathy [No Joint Swelling] : no joint swelling [Grossly Normal Strength/Tone] : grossly normal strength/tone [No Spinal Tenderness] : no spinal tenderness [No Focal Deficits] : no focal deficits [No Rash] : no rash [Coordination Grossly Intact] : coordination grossly intact [Normal Insight/Judgement] : insight and judgment were intact [Normal Gait] : normal gait [Normal Affect] : the affect was normal [de-identified] : ANXIOUS

## 2020-09-09 NOTE — REVIEW OF SYSTEMS
[Palpitations] : palpitations [Headache] : headache [Dizziness] : dizziness [Nausea] : nausea [Insomnia] : insomnia [Anxiety] : anxiety [Negative] : Heme/Lymph

## 2020-09-09 NOTE — ASSESSMENT
[FreeTextEntry1] : 55 Y OLD FEM WITH WORSENING BPPV WITH HA= REFER TO SEE NEURO AND ENT SINCE MECLIZINE NOT HELPING AND SX GETTING MORE FREQUENT AND MORE INTENSE\par PALPITATIONS= AS PER CARDIOLOGY \par ANXIETY AND INSOMNIA= DISCUSSED IN DETAIL ;REFER [facility] PSYCH AND RECOMM F/U PSYCH RECOMM

## 2020-10-01 ENCOUNTER — APPOINTMENT (OUTPATIENT)
Dept: HEART AND VASCULAR | Facility: CLINIC | Age: 55
End: 2020-10-01
Payer: COMMERCIAL

## 2020-10-01 VITALS
RESPIRATION RATE: 14 BRPM | DIASTOLIC BLOOD PRESSURE: 92 MMHG | TEMPERATURE: 98.9 F | OXYGEN SATURATION: 98 % | HEIGHT: 64 IN | HEART RATE: 75 BPM | BODY MASS INDEX: 25.95 KG/M2 | SYSTOLIC BLOOD PRESSURE: 143 MMHG | WEIGHT: 152 LBS

## 2020-10-01 PROCEDURE — 99214 OFFICE O/P EST MOD 30 MIN: CPT

## 2020-10-01 NOTE — PHYSICAL EXAM
[General Appearance - Well Developed] : well developed [Normal Appearance] : normal appearance [Well Groomed] : well groomed [General Appearance - Well Nourished] : well nourished [No Deformities] : no deformities [General Appearance - In No Acute Distress] : no acute distress [Respiration, Rhythm And Depth] : normal respiratory rhythm and effort [Exaggerated Use Of Accessory Muscles For Inspiration] : no accessory muscle use [Auscultation Breath Sounds / Voice Sounds] : lungs were clear to auscultation bilaterally [Heart Rate And Rhythm] : heart rate and rhythm were normal [Heart Sounds] : normal S1 and S2 [Murmurs] : no murmurs present [Abdomen Soft] : soft [Abdomen Tenderness] : non-tender [Abdomen Mass (___ Cm)] : no abdominal mass palpated [Abnormal Walk] : normal gait [Gait - Sufficient For Exercise Testing] : the gait was sufficient for exercise testing [Nail Clubbing] : no clubbing of the fingernails [Cyanosis, Localized] : no localized cyanosis [Petechial Hemorrhages (___cm)] : no petechial hemorrhages [] : no ischemic changes

## 2020-10-02 NOTE — ASSESSMENT
[FreeTextEntry1] : 56 yo F with PMH pre DM, anxiety, palpitations here for followup.\par \par PALPITATIONS\par resolved after starting beta blocker \par -Holter shows evidence of sinus tachycardia, one episode of short runs of supraventricular tachycardia.\par -recommended to continue to  take Toprol XL 25 mg daily \par -recc to monitor for palpitations  \par -echo wnl\par -nuc stress test normal\par -CMP and TSH wnl\par \par HTN\par -uncontrolled today\par -continue with toprol XL 25 mg daily  \par -increase lisinopril to 5 mg daily\par -Recommend to monitor blood pressure at home and keep BP log\par -Call clinic if blood pressure is persistently > 140/90\par -echo to r/o any WMA\par -CMP at next visit\par \par HLD\par -Recc healthy diet and exercise \par -repeat lipid panel at next visit \par \par \par Follow up in one week for echo results and BP check

## 2020-10-02 NOTE — HISTORY OF PRESENT ILLNESS
[FreeTextEntry1] : 54 yo F with PMH pre DM, anxiety, palpitations here for followup of htn. \par \par Reports BP at home 170/180's/ a/w HA/dizziness twice last week. \par Reports typical BP readings at home are 140-150's/90's\par Lisinopril 2.5mg daily was added this past Tuesday. \par Denies CP/LE edema/syncope/palpitations\par Reports good exercise tolerance\par Reports med compliance\par Was given hctz 12.5 by neurologist (Dr Paige) but she is not taking it\par \par As per last visit:\par The patient reports significant improvement of her palpitations after starting toprol XL 25 mg daily \par Today she brings a BP log with BP readings all in the range 120-130/60-80s\par She reports feeling fine with unlimited exercise tolerance even though she is now walking less in the setting of the COVID-19 pandemic \par \par PMH\par anxiety/panic attacks\par palpitations\par pre DM\par \par ALL\par NKDA\par \par MEDS\par toprol XL 25 mg daily \par velaflaxine \par calcium \par vitamins\par lisinopril 2.5mg daily\par meclizine\par \par FH\par grandfather with MI\par \par SH\par no smoke, no etoh, no drugs\par \par \par EKG 9/24/2019 \par SR, TWI V1-V3\par EKG 10/1/2020 SB, diffuse ST depression, negative T waves\par \par NUC stress test 10/10/2019 negative for ischemia , normal LVEF\par

## 2020-10-26 ENCOUNTER — RX RENEWAL (OUTPATIENT)
Age: 55
End: 2020-10-26

## 2020-11-10 ENCOUNTER — APPOINTMENT (OUTPATIENT)
Dept: HEART AND VASCULAR | Facility: CLINIC | Age: 55
End: 2020-11-10
Payer: COMMERCIAL

## 2020-11-10 VITALS
OXYGEN SATURATION: 98 % | TEMPERATURE: 97.2 F | WEIGHT: 155 LBS | HEIGHT: 64 IN | RESPIRATION RATE: 14 BRPM | SYSTOLIC BLOOD PRESSURE: 140 MMHG | BODY MASS INDEX: 26.46 KG/M2 | DIASTOLIC BLOOD PRESSURE: 73 MMHG | HEART RATE: 68 BPM

## 2020-11-10 PROCEDURE — 99072 ADDL SUPL MATRL&STAF TM PHE: CPT

## 2020-11-10 PROCEDURE — 99214 OFFICE O/P EST MOD 30 MIN: CPT

## 2020-11-10 NOTE — HISTORY OF PRESENT ILLNESS
[FreeTextEntry1] : 56 yo F with PMH pre DM, anxiety, palpitations here for followup and obtain echo results\par The patient reports feeling better, reports good exercise tolerance without symptoms\par Denies chest pain, shortness of breath, palpitations, syncope, presyncope, LE edema, orthopnea. \par REports that she noticed that her palpitations occur only when she is anxious. \par She brings a BP log with -130s in the last month\par \par PMH\par HTN\par anxiety/panic attacks\par palpitations\par pre DM\par \par ALL\par NKDA\par \par MEDS\par toprol XL 25 mg daily \par venlafaxine \par calcium \par vitamins\par lisinopril 5mg daily\par meclizine\par \par FH\par grandfather with MI\par \par SH\par no smoke, no etoh, no drugs\par \par EKG 9/24/2019 \par SR, TWI V1-V3\par EKG 10/1/2020 SB, diffuse ST depression, negative T waves\par \par NUC stress test 10/10/2019 negative for ischemia , normal LVEF\par \par ECHOCARDIOGRAM 11/6/2020\par normal LV size and function (EF 60-65% )

## 2020-11-10 NOTE — ASSESSMENT
[FreeTextEntry1] : 54 yo F with PMH pre DM, anxiety, palpitations here for followup.\par \par PALPITATIONS\par resolved after starting beta blocker, reports that symptoms are only in the setting of anxiety/panic attack\par -recc continuing to follow up with PMD re: anxiolytic therapy\par -Holter shows evidence of sinus tachycardia, one episode of short runs of supraventricular tachycardia.\par -recommended to continue to  take Toprol XL 25 mg daily \par -recc to monitor for palpitations  \par -echo 10/2020 wnl\par -nuc stress test 2019  normal\par -CMP and TSH wnl 2019\par \par HTN\par -better controlled \par -continue with toprol XL 25 mg daily  \par -continue with lisinopril to 5 mg daily\par -Recommend to monitor blood pressure at home and keep BP log\par -Call clinic if blood pressure is persistently > 140/90\par -echo 10/2020 wnl\par \par HLD\par -Recc healthy diet and exercise \par -repeat lipid panel at next visit \par \par \par f/u in 6 months or sooner if any symptoms

## 2020-11-30 ENCOUNTER — LABORATORY RESULT (OUTPATIENT)
Age: 55
End: 2020-11-30

## 2020-12-01 ENCOUNTER — APPOINTMENT (OUTPATIENT)
Dept: HEART AND VASCULAR | Facility: CLINIC | Age: 55
End: 2020-12-01
Payer: COMMERCIAL

## 2020-12-01 PROCEDURE — 99072 ADDL SUPL MATRL&STAF TM PHE: CPT

## 2020-12-01 PROCEDURE — 36415 COLL VENOUS BLD VENIPUNCTURE: CPT

## 2020-12-04 ENCOUNTER — APPOINTMENT (OUTPATIENT)
Dept: INTERNAL MEDICINE | Facility: CLINIC | Age: 55
End: 2020-12-04
Payer: COMMERCIAL

## 2020-12-04 VITALS
DIASTOLIC BLOOD PRESSURE: 84 MMHG | HEART RATE: 94 BPM | WEIGHT: 148 LBS | OXYGEN SATURATION: 97 % | SYSTOLIC BLOOD PRESSURE: 148 MMHG | RESPIRATION RATE: 14 BRPM | BODY MASS INDEX: 25.27 KG/M2 | TEMPERATURE: 97.3 F | HEIGHT: 64 IN

## 2020-12-04 PROCEDURE — 99072 ADDL SUPL MATRL&STAF TM PHE: CPT

## 2020-12-04 PROCEDURE — 99396 PREV VISIT EST AGE 40-64: CPT | Mod: 25

## 2020-12-04 RX ORDER — ASPIRIN ENTERIC COATED TABLETS 81 MG 81 MG/1
81 TABLET, DELAYED RELEASE ORAL DAILY
Qty: 30 | Refills: 3 | Status: DISCONTINUED | COMMUNITY
Start: 2019-10-17 | End: 2020-12-04

## 2020-12-04 RX ORDER — VENLAFAXINE HYDROCHLORIDE 37.5 MG/1
37.5 CAPSULE, EXTENDED RELEASE ORAL DAILY
Qty: 30 | Refills: 1 | Status: DISCONTINUED | COMMUNITY
Start: 2019-10-30 | End: 2020-12-04

## 2020-12-04 NOTE — HISTORY OF PRESENT ILLNESS
[de-identified] : AFTER 2 Y ON EFFEXOR PSYCH TAPER TO OFF FEW DAYS AGO ,PT DEVELOPED NAUSEA AND VOMITING AND PSYCH YESTERDAY RX 10 MG OF ? NAME TO START TODAY\par PT HAS BEEN HOLDING METOPROLOL 25 MG OR LISINOPRIL 5 MG  IF BP WAS JÚNIOR THAN 130/80

## 2020-12-04 NOTE — ASSESSMENT
[FreeTextEntry1] : CPE OF 55 Y OLD FEM WITH PMX OF DONALDO= AGREE WITH PSYCH \par BPPV= BETTER TODAY ,OBSERVE\par DYSLIPIDEMIA = DIET AND EXERCISE\par HTN = DO NOT HOLD MEDS UNLESS BP LESS THAN 105/65 OR PULSE LESS THAN 50\par RTO IN 3 M

## 2020-12-08 ENCOUNTER — APPOINTMENT (OUTPATIENT)
Dept: HEART AND VASCULAR | Facility: CLINIC | Age: 55
End: 2020-12-08

## 2020-12-20 ENCOUNTER — RX RENEWAL (OUTPATIENT)
Age: 55
End: 2020-12-20

## 2021-01-12 ENCOUNTER — APPOINTMENT (OUTPATIENT)
Dept: HEART AND VASCULAR | Facility: CLINIC | Age: 56
End: 2021-01-12
Payer: COMMERCIAL

## 2021-01-12 VITALS
OXYGEN SATURATION: 98 % | RESPIRATION RATE: 14 BRPM | DIASTOLIC BLOOD PRESSURE: 92 MMHG | HEART RATE: 79 BPM | TEMPERATURE: 98.4 F | WEIGHT: 151 LBS | HEIGHT: 64 IN | SYSTOLIC BLOOD PRESSURE: 152 MMHG | BODY MASS INDEX: 25.78 KG/M2

## 2021-01-12 PROCEDURE — 99214 OFFICE O/P EST MOD 30 MIN: CPT

## 2021-01-12 PROCEDURE — 99072 ADDL SUPL MATRL&STAF TM PHE: CPT

## 2021-01-12 NOTE — HISTORY OF PRESENT ILLNESS
[FreeTextEntry1] : 54 yo F with PMH pre DM, anxiety, palpitations here for followup \par The patient requested add on visit has she has been experiencing night palpitations. \par She reports that she self discontinued her anxiolytics and reports new onset of palpitations which are not resolving now that she is back on her meds. \par Reports excellent exercise tolerance\par Denies chest pain, shortness of breath, syncope, presyncope, LE edema, orthopnea. \par \par \par PMH\par HTN\par anxiety/panic attacks\par palpitations\par pre DM\par \par ALL\par NKDA\par \par MEDS\par toprol XL 25 mg daily \par venlafaxine \par calcium \par vitamins\par lisinopril 5mg daily\par meclizine\par \par FH\par grandfather with MI\par \par SH\par no smoke, no etoh, no drugs\par \par EKG 9/24/2019 \par SR, TWI V1-V3\par EKG 10/1/2020 SB, diffuse ST depression, negative T waves\par \par NUC stress test 10/10/2019 negative for ischemia , normal LVEF\par \par ECHOCARDIOGRAM 11/6/2020\par normal LV size and function (EF 60-65% )

## 2021-01-12 NOTE — ASSESSMENT
[FreeTextEntry1] : 54 yo F with PMH pre DM, anxiety, palpitations here for followup.\par \par PALPITATIONS\par symptoms had resolved after starting beta blocker, and the patient reported  that symptoms are only in the setting of anxiety/panic attack\par -as the patient is very concerned and in the setting of resolution of symptoms on anxiety tp, will order 72 hours holter to r/o arrhythmias\par -recc continuing to follow up with PMD re: anxiolytic therapy\par -recommended to continue to  take Toprol XL 25 mg daily \par -echo 10/2020 wnl\par -nuc stress test 2019  normal\par -CMP and TSH wnl 12/2020\par \par HTN\par -not controlled \par -continue with toprol XL 25 mg daily  \par -uptitrate lisinopril to 10 mg daily\par -Recommend to monitor blood pressure at home and keep BP log\par -Call clinic if blood pressure is persistently > 140/90\par -echo 10/2020 wnl\par \par HLD\par -Recc healthy diet and exercise \par -repeat lipid panel at next visit \par \par \par f/u in 1 months for holter results and symptom assessment

## 2021-02-04 ENCOUNTER — APPOINTMENT (OUTPATIENT)
Dept: HEART AND VASCULAR | Facility: CLINIC | Age: 56
End: 2021-02-04
Payer: COMMERCIAL

## 2021-02-04 ENCOUNTER — NON-APPOINTMENT (OUTPATIENT)
Age: 56
End: 2021-02-04

## 2021-02-04 VITALS
SYSTOLIC BLOOD PRESSURE: 140 MMHG | DIASTOLIC BLOOD PRESSURE: 80 MMHG | HEART RATE: 78 BPM | RESPIRATION RATE: 15 BRPM | OXYGEN SATURATION: 97 % | BODY MASS INDEX: 26.29 KG/M2 | TEMPERATURE: 98.1 F | HEIGHT: 64 IN | WEIGHT: 154 LBS

## 2021-02-04 PROCEDURE — 99072 ADDL SUPL MATRL&STAF TM PHE: CPT

## 2021-02-04 PROCEDURE — 99213 OFFICE O/P EST LOW 20 MIN: CPT

## 2021-02-04 NOTE — ASSESSMENT
[FreeTextEntry1] : 56 yo F with PMH pre DM, anxiety, palpitations here for followup.\par \par PALPITATIONS\par symptoms had resolved after starting beta blocker, and anxiolytics \par -the patient reported  that symptoms are only in the setting of anxiety/panic attack\par -holter monitor 1/2021 wnl \par -recc continuing to follow up with PMD re: anxiolytic therapy\par -recommended to continue to  take Toprol XL 25 mg daily \par -echo 10/2020 wnl\par -nuc stress test 2019  normal\par -CMP and TSH wnl 12/2020\par \par HTN\par -well controlled\par -continue with toprol XL 25 mg daily  \par -continue with lisinopril to 10 mg daily\par -Recommend to monitor blood pressure at home and keep BP log\par -Call clinic if blood pressure is persistently > 140/90\par -echo 10/2020 wnl\par \par HLD\par -Recc healthy diet and exercise \par -repeat lipid panel at next visit \par \par \par f/u in 4 months or sooner if any symptoms

## 2021-02-04 NOTE — HISTORY OF PRESENT ILLNESS
[FreeTextEntry1] : 54 yo F with PMH pre DM, anxiety, palpitations here for followup \par She reports resolution of her palpitations after restarting her anxiolytics. \par Had also a holter monitor which is wnl. \par Reports BP is wnll controlled after up titration of her meds\par Reports excellent exercise tolerance\par Denies chest pain, shortness of breath, syncope, presyncope, LE edema, orthopnea. \par \par \par PMH\par HTN\par anxiety/panic attacks\par palpitations\par pre DM\par \par ALL\par NKDA\par \par MEDS\par toprol XL 25 mg daily \par venlafaxine \par calcium \par vitamins\par lisinopril 10 mg daily\par meclizine\par \par FH\par grandfather with MI\par \par SH\par no smoke, no etoh, no drugs\par \par EKG 9/24/2019 \par SR, TWI V1-V3\par EKG 10/1/2020 SB, diffuse ST depression, negative T waves\par \par NUC stress test 10/10/2019 negative for ischemia , normal LVEF\par \par ECHOCARDIOGRAM 11/6/2020\par normal LV size and function (EF 60-65% )\par \par \par HOLTER 1/21/2021 ( 7 days) \par SR\par SB at night \par 1 episode of three beats atach at 4 am (asymptomatic)

## 2021-03-10 ENCOUNTER — APPOINTMENT (OUTPATIENT)
Dept: INTERNAL MEDICINE | Facility: CLINIC | Age: 56
End: 2021-03-10
Payer: COMMERCIAL

## 2021-03-10 VITALS
WEIGHT: 149 LBS | DIASTOLIC BLOOD PRESSURE: 84 MMHG | TEMPERATURE: 97.5 F | HEIGHT: 64 IN | HEART RATE: 74 BPM | RESPIRATION RATE: 14 BRPM | BODY MASS INDEX: 25.44 KG/M2 | OXYGEN SATURATION: 98 % | SYSTOLIC BLOOD PRESSURE: 133 MMHG

## 2021-03-10 DIAGNOSIS — M62.838 OTHER MUSCLE SPASM: ICD-10-CM

## 2021-03-10 PROCEDURE — 99072 ADDL SUPL MATRL&STAF TM PHE: CPT

## 2021-03-10 PROCEDURE — 99215 OFFICE O/P EST HI 40 MIN: CPT

## 2021-03-10 NOTE — REVIEW OF SYSTEMS
[Palpitations] : palpitations [Abdominal Pain] : abdominal pain [Joint Pain] : joint pain [Muscle Pain] : muscle pain [Back Pain] : back pain [Insomnia] : insomnia [Anxiety] : anxiety [Negative] : Heme/Lymph

## 2021-03-10 NOTE — ASSESSMENT
[FreeTextEntry1] : 56 Y OLD FEM WITH WORSENING INSOMNIA AND DEPRESSION = F/U PSYCH \par MUSCLE SPASM ,BACK PAIN = START DULOXETINE 20 MG DAILY\par HTN AND DYSLIPIDEMIA = CONTINUE CURRENT MEDS \par RTO IN 1 M \par TIME OF CARE 45 MIN

## 2021-03-10 NOTE — HISTORY OF PRESENT ILLNESS
[FreeTextEntry1] : PALPITATIONS\par CHEST PAIN \par BACK PAIN \par ANXIETY \par INSOMNIA\par ABD PAIN [de-identified] : PT SEEN BY CARDIOLOGY AND PSYCH REGULARLY \par OLDER BROTHER IN [place] HOSPITALIZED WITH AMI AND CHOLECYSTITIS \par HER ANXIETY ,PALPITATIONS AND INSOMNIA AS WELL AS ATYP CHEST PAIN ,SHOULDER AND UPPER BACK PAIN ARE WORSE ;TAKING CLONAZEPAM PRN AS PER PSYCH

## 2021-03-10 NOTE — PHYSICAL EXAM

## 2021-04-12 ENCOUNTER — APPOINTMENT (OUTPATIENT)
Dept: INTERNAL MEDICINE | Facility: CLINIC | Age: 56
End: 2021-04-12
Payer: COMMERCIAL

## 2021-04-12 VITALS
HEART RATE: 89 BPM | TEMPERATURE: 97.7 F | DIASTOLIC BLOOD PRESSURE: 89 MMHG | BODY MASS INDEX: 24.92 KG/M2 | OXYGEN SATURATION: 98 % | RESPIRATION RATE: 14 BRPM | HEIGHT: 64 IN | WEIGHT: 146 LBS | SYSTOLIC BLOOD PRESSURE: 144 MMHG

## 2021-04-12 DIAGNOSIS — M62.830 MUSCLE SPASM OF BACK: ICD-10-CM

## 2021-04-12 PROCEDURE — 99215 OFFICE O/P EST HI 40 MIN: CPT

## 2021-04-12 PROCEDURE — 99072 ADDL SUPL MATRL&STAF TM PHE: CPT

## 2021-04-12 RX ORDER — DULOXETINE HYDROCHLORIDE 20 MG/1
20 CAPSULE, DELAYED RELEASE PELLETS ORAL
Qty: 30 | Refills: 1 | Status: DISCONTINUED | COMMUNITY
Start: 2021-03-10 | End: 2021-04-12

## 2021-04-12 NOTE — REVIEW OF SYSTEMS
[Fatigue] : fatigue [Chest Pain] : chest pain [Nausea] : nausea [Muscle Pain] : muscle pain [Back Pain] : back pain [Insomnia] : insomnia [Anxiety] : anxiety [Depression] : depression [Negative] : Heme/Lymph

## 2021-04-12 NOTE — ASSESSMENT
[FreeTextEntry1] : 56 Y OLD FEM WITH PERSISTENT DEPRESSION ,ANXIETY ,PANIC ATTACKS AND INSOMNIA= F/U PSYCH THIS WEEK\par PSYCHOLOGY EVAL AS RECOMM BY PSYCH ,CONTINUE CURRENT MEDS RX BY PSYCH \par MYALGIAS= LAST SEEN BY [facility] 5 Y AGO = F/U RECOMM\par NAUSEA AND MILD WT LOSS= BEEN EVAL BY GI CURRENTLY;LAST COLONOSCOPY 1 Y AGO OR SO

## 2021-04-12 NOTE — PHYSICAL EXAM
[Normal] : no carotid or abdominal bruits heard, no varicosities, pedal pulses are present, no peripheral edema, no extremity clubbing or cyanosis and no palpable aorta [Soft] : abdomen soft [Non Tender] : non-tender [Coordination Grossly Intact] : coordination grossly intact [Person] : oriented to person [Place] : oriented to place [Time] : oriented to time [Short Term Intact] : short term memory intact [Remote Intact] : remote memory intact [Registration Intact] : recent registration memory intact [Span Intact] : the attention span was normal [Concentration Intact] : normal concentrating ability [Visual Intact] : visual attention was ~T not ~L decreased [Anxious] : anxious [Depressed] : depressed [Labile] : labile [Delusions] : exhibited no delusions [Hallucinations] : exhibited no hallucinations [Obsessions] : denied obsessions [Preoccupation with Violence] : denied preoccupation with violent thoughts [Suicidal Ideation] : denied suicidal ideation [Suicidal Intent] : denied suicidal intent [Suicidal Plan] : denied suicidal plans [Homicidal Ideation] : denied homicidal ideation [Homicidal Intent] : denied homicidal intention [Homicidal Plan] : denied homicidal plans

## 2021-04-12 NOTE — HISTORY OF PRESENT ILLNESS
[de-identified] : PT SEEN AT [facility] 4/9-10 WHEN SHE DEVELOPED WORSENING ANXIETY ,LEFT SIDE CHEST AND NECK PAIN \par SEEN BY CARDIOLOGY ,HAD SERIAL EKG (NOT SEEN) AND SERIAL NORMAL TROPONIN\par GIVEN LORAZEPAM AND  METOPROLOL WHILE THERE\par PT SAW PSYCH LAST WEEK AND PLACED ON FLUOXETINE 5 MG DAILY  AND CLONAZEPAM AT HS \par PT EMOTIONAL ,DEPRESSED AND WITH FREQUENT PANIC ATTACKS;AFRAID OF COVID-19 VACCINE\par TROUBLE SLEEPING AND DAYTIME SOMNOLENCE\par DULOXETINE RX 1 M AGO WAS STOP DUE TO LOWER EXTR PARESTHESIAS X2 DOSES

## 2021-04-19 ENCOUNTER — RX RENEWAL (OUTPATIENT)
Age: 56
End: 2021-04-19

## 2021-06-04 ENCOUNTER — APPOINTMENT (OUTPATIENT)
Dept: INTERNAL MEDICINE | Facility: CLINIC | Age: 56
End: 2021-06-04
Payer: COMMERCIAL

## 2021-06-04 VITALS
DIASTOLIC BLOOD PRESSURE: 86 MMHG | SYSTOLIC BLOOD PRESSURE: 127 MMHG | BODY MASS INDEX: 25.44 KG/M2 | TEMPERATURE: 97.7 F | OXYGEN SATURATION: 97 % | HEART RATE: 70 BPM | RESPIRATION RATE: 14 BRPM | HEIGHT: 64 IN | WEIGHT: 149 LBS

## 2021-06-04 DIAGNOSIS — K59.00 CONSTIPATION, UNSPECIFIED: ICD-10-CM

## 2021-06-04 DIAGNOSIS — R10.9 UNSPECIFIED ABDOMINAL PAIN: ICD-10-CM

## 2021-06-04 PROCEDURE — 99214 OFFICE O/P EST MOD 30 MIN: CPT

## 2021-06-04 NOTE — PHYSICAL EXAM
[No Acute Distress] : no acute distress [Well Nourished] : well nourished [Well Developed] : well developed [Well-Appearing] : well-appearing [Normal Sclera/Conjunctiva] : normal sclera/conjunctiva [PERRL] : pupils equal round and reactive to light [EOMI] : extraocular movements intact [No JVD] : no jugular venous distention [No Lymphadenopathy] : no lymphadenopathy [Supple] : supple [Thyroid Normal, No Nodules] : the thyroid was normal and there were no nodules present [No Respiratory Distress] : no respiratory distress  [No Accessory Muscle Use] : no accessory muscle use [Clear to Auscultation] : lungs were clear to auscultation bilaterally [Normal Rate] : normal rate  [Regular Rhythm] : with a regular rhythm [No Murmur] : no murmur heard [Normal S1, S2] : normal S1 and S2 [No Carotid Bruits] : no carotid bruits [No Abdominal Bruit] : a ~M bruit was not heard ~T in the abdomen [No Varicosities] : no varicosities [Pedal Pulses Present] : the pedal pulses are present [No Edema] : there was no peripheral edema [No Palpable Aorta] : no palpable aorta [No Extremity Clubbing/Cyanosis] : no extremity clubbing/cyanosis [Soft] : abdomen soft [Non-distended] : non-distended [No Masses] : no abdominal mass palpated [No HSM] : no HSM [Normal Bowel Sounds] : normal bowel sounds [Firm] : not firm [Rigid] : not rigid [Rebound] : no rebound [Guarding] : no guarding [Normal Posterior Cervical Nodes] : no posterior cervical lymphadenopathy [Normal Anterior Cervical Nodes] : no anterior cervical lymphadenopathy [No CVA Tenderness] : no CVA  tenderness [No Spinal Tenderness] : no spinal tenderness [No Joint Swelling] : no joint swelling [Grossly Normal Strength/Tone] : grossly normal strength/tone [No Rash] : no rash [Coordination Grossly Intact] : coordination grossly intact [No Focal Deficits] : no focal deficits [Normal Gait] : normal gait [Deep Tendon Reflexes (DTR)] : deep tendon reflexes were 2+ and symmetric [Normal Affect] : the affect was normal [Normal Insight/Judgement] : insight and judgment were intact [Anxious] : anxious

## 2021-06-04 NOTE — HISTORY OF PRESENT ILLNESS
[de-identified] : PT COMES WITH CC OF CONSTIPATION FOR WEEKS ,TAKING MIRTAZAPINE 7.5 MG BY PSYCH DR DEVI AND SHE READ THAT I CAN CAUSE CONSTIPATION \par STILL HAVING EPISODES OF ANXIETY ,HELPED BY CLONAZEPAM AND NAPPING\par CC OF ABD BLOATING AND DIFFUSE ABD PAIN \par CC OF BP READING 105/70 IN AM AND HAS BEEN TAKING METOPROLOL ON /OFF \par WHEN ANXIOUS DEVELOPS FACIAL THROBBING AND HER BP GETS ELEVATED

## 2021-06-04 NOTE — ASSESSMENT
[FreeTextEntry1] : 56 Y OLD FEM WITH PMX OF HTN = CONTINUE LISINOPRIL 10 MG DAILY ,HOLD METOPROLOL TILL SEEN BY CARDIO SINCE PALPITATIONS ARE RESOLVED\par DYSLIPIDEMIA = LABS\par ABD PAIN AND CONSTIPATION = TO SEE GI \par START MIRALAX DAILY\par PT HA D COVID-19 VACCINE X2

## 2021-06-06 LAB
ALBUMIN SERPL ELPH-MCNC: 4.7 G/DL
ALP BLD-CCNC: 112 U/L
ALT SERPL-CCNC: 25 U/L
ANION GAP SERPL CALC-SCNC: 13 MMOL/L
AST SERPL-CCNC: 22 U/L
BILIRUB SERPL-MCNC: 0.2 MG/DL
BUN SERPL-MCNC: 16 MG/DL
CALCIUM SERPL-MCNC: 9.7 MG/DL
CHLORIDE SERPL-SCNC: 105 MMOL/L
CO2 SERPL-SCNC: 24 MMOL/L
CREAT SERPL-MCNC: 0.6 MG/DL
ESTIMATED AVERAGE GLUCOSE: 126 MG/DL
GLUCOSE SERPL-MCNC: 91 MG/DL
HBA1C MFR BLD HPLC: 6 %
POTASSIUM SERPL-SCNC: 4.5 MMOL/L
PROT SERPL-MCNC: 7.6 G/DL
SODIUM SERPL-SCNC: 141 MMOL/L

## 2021-06-08 ENCOUNTER — RX CHANGE (OUTPATIENT)
Age: 56
End: 2021-06-08

## 2021-06-10 ENCOUNTER — APPOINTMENT (OUTPATIENT)
Dept: HEART AND VASCULAR | Facility: CLINIC | Age: 56
End: 2021-06-10

## 2021-06-11 LAB
CHOLEST SERPL-MCNC: 208 MG/DL
HDLC SERPL-MCNC: 44 MG/DL
LDLC SERPL CALC-MCNC: 120 MG/DL
NONHDLC SERPL-MCNC: 165 MG/DL
TRIGL SERPL-MCNC: 222 MG/DL

## 2021-06-24 ENCOUNTER — APPOINTMENT (OUTPATIENT)
Dept: HEART AND VASCULAR | Facility: CLINIC | Age: 56
End: 2021-06-24
Payer: COMMERCIAL

## 2021-06-24 VITALS
DIASTOLIC BLOOD PRESSURE: 81 MMHG | HEART RATE: 64 BPM | RESPIRATION RATE: 14 BRPM | HEIGHT: 64 IN | BODY MASS INDEX: 25.27 KG/M2 | WEIGHT: 148 LBS | TEMPERATURE: 97.7 F | OXYGEN SATURATION: 98 % | SYSTOLIC BLOOD PRESSURE: 134 MMHG

## 2021-06-24 PROCEDURE — 99214 OFFICE O/P EST MOD 30 MIN: CPT

## 2021-06-24 NOTE — ASSESSMENT
[FreeTextEntry1] : 54 yo F with PMH pre DM, anxiety, palpitations here for followup.\par \par PALPITATIONS\par symptoms had resolved after starting beta blocker, and anxiolytics \par -the patient reported  that symptoms are only in the setting of anxiety/panic attack\par -holter monitor 1/2021 wnl \par -recc continuing to follow up with PMD re: anxiolytic therapy\par -recommended to continue to  take Toprol XL 25 mg daily \par -echo 10/2020 wnl\par -nuc stress test 2019  normal\par -CMP and TSH wnl 12/2020\par \par HTN\par -well controlled\par -continue with toprol XL 25 mg daily  \par -continue with lisinopril to 10 mg daily\par -Recommend to monitor blood pressure at home and keep BP log\par -Call clinic if blood pressure is persistently > 140/90\par -echo 10/2020 wnl\par \par HLD\par Lipid  panel 6/11/2021: Triglycerides 222, cholesterol 208, HDL 44, \par -Recc healthy diet and exercise \par -repeat lipid panel at next visit \par \par \par f/u in 4 months or sooner if any symptoms

## 2021-06-24 NOTE — HISTORY OF PRESENT ILLNESS
[FreeTextEntry1] : 55 yo F with PMH pre DM, anxiety, palpitations here for followup \par She reports resolution of her palpitations after restarting her anxiolytics. \par Reports excellent exercise tolerance\par Denies chest pain, shortness of breath, syncope, presyncope, LE edema, orthopnea. \par \par \par PMH\par HTN\par anxiety/panic attacks\par palpitations\par pre DM\par \par ALL\par NKDA\par \par MEDS\par toprol XL 25 mg daily \par venlafaxine \par calcium \par vitamins\par lisinopril 10 mg daily\par meclizine\par \par FH\par grandfather with MI\par \par SH\par no smoke, no etoh, no drugs\par \par EKG 9/24/2019 \par SR, TWI V1-V3\par EKG 10/1/2020 SB, diffuse ST depression, negative T waves\par \par NUC stress test 10/10/2019 negative for ischemia , normal LVEF\par \par ECHOCARDIOGRAM 11/6/2020\par normal LV size and function (EF 60-65% )\par \par \par HOLTER 1/21/2021 ( 7 days) \par SR\par SB at night \par 1 episode of three beats atach at 4 am (asymptomatic)

## 2021-07-21 ENCOUNTER — RX RENEWAL (OUTPATIENT)
Age: 56
End: 2021-07-21

## 2021-08-30 ENCOUNTER — APPOINTMENT (OUTPATIENT)
Dept: INTERNAL MEDICINE | Facility: CLINIC | Age: 56
End: 2021-08-30
Payer: COMMERCIAL

## 2021-08-30 VITALS
BODY MASS INDEX: 25.44 KG/M2 | SYSTOLIC BLOOD PRESSURE: 152 MMHG | HEART RATE: 82 BPM | RESPIRATION RATE: 14 BRPM | DIASTOLIC BLOOD PRESSURE: 87 MMHG | OXYGEN SATURATION: 98 % | TEMPERATURE: 97.3 F | HEIGHT: 64 IN | WEIGHT: 149 LBS

## 2021-08-30 VITALS — SYSTOLIC BLOOD PRESSURE: 138 MMHG | DIASTOLIC BLOOD PRESSURE: 78 MMHG

## 2021-08-30 PROCEDURE — 99214 OFFICE O/P EST MOD 30 MIN: CPT

## 2021-08-30 NOTE — REVIEW OF SYSTEMS
[Headache] : headache [Anxiety] : anxiety [Negative] : Heme/Lymph [FreeTextEntry3] : SEE HPI [de-identified] : SEE HPI

## 2021-08-30 NOTE — ASSESSMENT
[FreeTextEntry1] : 56 Y OLD FEM WITH DONALDO= F/U PSCYH \par DRY EYE SYNDROME AND CHANGE IN VISION = F/U OPHTHA\par HA AND TINNITUS PLUS CONFUSION = NEURO EVAL \par HTN = STABLE

## 2021-08-30 NOTE — HISTORY OF PRESENT ILLNESS
[de-identified] : PT COMES WITH CC OF STICKY EYES,DECREASED VISION AND HEAVINESS ,SEEN BY OPHTHA 6 WEEKS AGO\par CC OF HA AND CONFUSION LAST 2 M ;SEEN BY PSYCH VIA TELEHEALTH ON MIRTAZAPINE AND CLONAZEPAM PRN \par CC OF R TINNITUS,SEEN BY NEURO 1 Y AGO

## 2021-08-30 NOTE — PHYSICAL EXAM
[Normal] : affect was normal and insight and judgment were intact [Anxious] : anxious [Depressed] : depressed

## 2021-10-14 ENCOUNTER — APPOINTMENT (OUTPATIENT)
Dept: HEART AND VASCULAR | Facility: CLINIC | Age: 56
End: 2021-10-14
Payer: MEDICAID

## 2021-10-14 ENCOUNTER — NON-APPOINTMENT (OUTPATIENT)
Age: 56
End: 2021-10-14

## 2021-10-14 VITALS
SYSTOLIC BLOOD PRESSURE: 144 MMHG | RESPIRATION RATE: 15 BRPM | OXYGEN SATURATION: 97 % | BODY MASS INDEX: 25.44 KG/M2 | DIASTOLIC BLOOD PRESSURE: 82 MMHG | WEIGHT: 149 LBS | HEART RATE: 65 BPM | TEMPERATURE: 98.2 F | HEIGHT: 64 IN

## 2021-10-14 PROCEDURE — 99213 OFFICE O/P EST LOW 20 MIN: CPT

## 2021-10-14 NOTE — PHYSICAL EXAM
[General Appearance - Well Developed] : well developed [Normal Appearance] : normal appearance [Well Groomed] : well groomed [General Appearance - Well Nourished] : well nourished [No Deformities] : no deformities [General Appearance - In No Acute Distress] : no acute distress [Heart Rate And Rhythm] : heart rate and rhythm were normal [Heart Sounds] : normal S1 and S2 [Murmurs] : no murmurs present [Respiration, Rhythm And Depth] : normal respiratory rhythm and effort [Exaggerated Use Of Accessory Muscles For Inspiration] : no accessory muscle use [Auscultation Breath Sounds / Voice Sounds] : lungs were clear to auscultation bilaterally [Abdomen Soft] : soft [Abdomen Tenderness] : non-tender [Abdomen Mass (___ Cm)] : no abdominal mass palpated [Abnormal Walk] : normal gait [Gait - Sufficient For Exercise Testing] : the gait was sufficient for exercise testing [Nail Clubbing] : no clubbing of the fingernails [Cyanosis, Localized] : no localized cyanosis [Petechial Hemorrhages (___cm)] : no petechial hemorrhages [] : no ischemic changes [Well Developed] : well developed [Well Nourished] : well nourished [No Acute Distress] : no acute distress [Normal Conjunctiva] : normal conjunctiva [Normal Venous Pressure] : normal venous pressure [No Carotid Bruit] : no carotid bruit [Normal S1, S2] : normal S1, S2 [No Murmur] : no murmur [No Rub] : no rub [No Gallop] : no gallop [Clear Lung Fields] : clear lung fields [Good Air Entry] : good air entry [No Respiratory Distress] : no respiratory distress  [Soft] : abdomen soft [Non Tender] : non-tender [No Masses/organomegaly] : no masses/organomegaly [Normal Bowel Sounds] : normal bowel sounds [Normal Gait] : normal gait [No Edema] : no edema [No Cyanosis] : no cyanosis [No Clubbing] : no clubbing [No Varicosities] : no varicosities [No Rash] : no rash [No Skin Lesions] : no skin lesions [Moves all extremities] : moves all extremities [No Focal Deficits] : no focal deficits [Normal Speech] : normal speech [Alert and Oriented] : alert and oriented [Normal memory] : normal memory

## 2021-10-14 NOTE — ASSESSMENT
[FreeTextEntry1] : 54 yo F with PMH pre DM, anxiety, palpitations here for followup.\par \par PALPITATIONS\par -symptoms  resolved after starting beta blocker, and anxiolytics \par -the patient reported  that symptoms are only in the setting of anxiety/panic attack\par -holter monitor 1/2021 wnl \par -recc continuing to follow up with PMD re: anxiolytic therapy\par -recommended to continue to  take Toprol XL 25 mg daily \par -echo 10/2020 wnl\par -nuc stress test 2019  normal\par -Cr 0.60 6/6/2021, TSH 2.24 12/2020\par \par HTN\par -borderline today\par -continue with toprol XL 25 mg daily  \par -continue with lisinopril to 10 mg daily\par -Recommend to monitor blood pressure at home and keep BP log\par -Call clinic if blood pressure is persistently > 140/90\par -echo 10/2020 wnl\par \par HLD\par -repeat lipid panel today\par -will plan to start atorvastatin 10mg if increase in LDL\par -Recc healthy diet and exercise \par \par \par f/u in 6 months or sooner if any symptoms

## 2021-10-14 NOTE — HISTORY OF PRESENT ILLNESS
[FreeTextEntry1] : 55 yo F with PMH pre DM, anxiety, palpitations here for followup \par \par Palpitations completely resolved after restarting her anxiolytics. \par Reports excellent exercise tolerance\par Denies chest pain, shortness of breath, syncope, presyncope, LE edema, orthopnea. \par \par \par PMH\par HTN\par anxiety/panic attacks\par palpitations\par pre DM\par \par ALL\par NKDA\par \par MEDS\par toprol XL 25 mg daily \par venlafaxine \par calcium \par vitamins\par lisinopril 10 mg daily\par meclizine\par \par FH\par grandfather with MI\par \par SH\par no smoke, no etoh, no drugs\par \par \par EKG 9/24/2019 SR, TWI V1-V3\par EKG 10/1/2020 SB, diffuse ST depression, negative T waves\par EKG 10/14/2021 SR, non specific T wave abnormalities\par \par NUC stress test 10/10/2019 negative for ischemia , normal LVEF\par \par ECHOCARDIOGRAM 11/6/2020\par normal LV size and function (EF 60-65% )\par \par \par HOLTER 1/21/2021 ( 7 days) \par SR\par SB at night \par 1 episode of three beats atach at 4 am (asymptomatic)

## 2021-10-15 ENCOUNTER — RX RENEWAL (OUTPATIENT)
Age: 56
End: 2021-10-15

## 2021-11-23 DIAGNOSIS — H81.10 BENIGN PAROXYSMAL VERTIGO, UNSPECIFIED EAR: ICD-10-CM

## 2021-12-02 ENCOUNTER — APPOINTMENT (OUTPATIENT)
Dept: HEART AND VASCULAR | Facility: CLINIC | Age: 56
End: 2021-12-02
Payer: MEDICAID

## 2021-12-02 PROCEDURE — 36415 COLL VENOUS BLD VENIPUNCTURE: CPT

## 2021-12-03 LAB
ALBUMIN SERPL ELPH-MCNC: 4.7 G/DL
ALP BLD-CCNC: 99 U/L
ALT SERPL-CCNC: 25 U/L
ANION GAP SERPL CALC-SCNC: 20 MMOL/L
APPEARANCE: CLEAR
AST SERPL-CCNC: 23 U/L
BACTERIA: NEGATIVE
BASOPHILS # BLD AUTO: 0.05 K/UL
BASOPHILS NFR BLD AUTO: 0.7 %
BILIRUB SERPL-MCNC: 0.4 MG/DL
BILIRUBIN URINE: NEGATIVE
BLOOD URINE: NORMAL
BUN SERPL-MCNC: 15 MG/DL
CALCIUM SERPL-MCNC: 9.7 MG/DL
CHLORIDE SERPL-SCNC: 105 MMOL/L
CHOLEST SERPL-MCNC: 197 MG/DL
CO2 SERPL-SCNC: 19 MMOL/L
COLOR: YELLOW
CREAT SERPL-MCNC: 0.66 MG/DL
EOSINOPHIL # BLD AUTO: 0.18 K/UL
EOSINOPHIL NFR BLD AUTO: 2.5 %
ESTIMATED AVERAGE GLUCOSE: 128 MG/DL
GLUCOSE QUALITATIVE U: NEGATIVE
GLUCOSE SERPL-MCNC: 97 MG/DL
HBA1C MFR BLD HPLC: 6.1 %
HCT VFR BLD CALC: 39.2 %
HDLC SERPL-MCNC: 45 MG/DL
HGB BLD-MCNC: 12.7 G/DL
HYALINE CASTS: 3 /LPF
IMM GRANULOCYTES NFR BLD AUTO: 0.1 %
KETONES URINE: NEGATIVE
LDLC SERPL CALC-MCNC: 127 MG/DL
LEUKOCYTE ESTERASE URINE: ABNORMAL
LYMPHOCYTES # BLD AUTO: 3.06 K/UL
LYMPHOCYTES NFR BLD AUTO: 42.6 %
MAN DIFF?: NORMAL
MCHC RBC-ENTMCNC: 31.7 PG
MCHC RBC-ENTMCNC: 32.4 GM/DL
MCV RBC AUTO: 97.8 FL
MICROSCOPIC-UA: NORMAL
MONOCYTES # BLD AUTO: 0.5 K/UL
MONOCYTES NFR BLD AUTO: 7 %
NEUTROPHILS # BLD AUTO: 3.39 K/UL
NEUTROPHILS NFR BLD AUTO: 47.1 %
NITRITE URINE: NEGATIVE
NONHDLC SERPL-MCNC: 152 MG/DL
PH URINE: 6.5
PLATELET # BLD AUTO: 333 K/UL
POTASSIUM SERPL-SCNC: 4.7 MMOL/L
PROT SERPL-MCNC: 7.4 G/DL
PROTEIN URINE: NEGATIVE
RBC # BLD: 4.01 M/UL
RBC # FLD: 13.2 %
RED BLOOD CELLS URINE: 4 /HPF
SODIUM SERPL-SCNC: 144 MMOL/L
SPECIFIC GRAVITY URINE: 1.02
SQUAMOUS EPITHELIAL CELLS: 3 /HPF
TRIGL SERPL-MCNC: 126 MG/DL
UROBILINOGEN URINE: NORMAL
WBC # FLD AUTO: 7.19 K/UL
WHITE BLOOD CELLS URINE: 6 /HPF

## 2021-12-04 LAB
25(OH)D3 SERPL-MCNC: 28.9 NG/ML
TSH SERPL-ACNC: 1.61 UIU/ML

## 2021-12-06 ENCOUNTER — APPOINTMENT (OUTPATIENT)
Dept: INTERNAL MEDICINE | Facility: CLINIC | Age: 56
End: 2021-12-06
Payer: MEDICAID

## 2021-12-06 VITALS
BODY MASS INDEX: 25.78 KG/M2 | TEMPERATURE: 98.7 F | WEIGHT: 151 LBS | DIASTOLIC BLOOD PRESSURE: 82 MMHG | HEART RATE: 71 BPM | SYSTOLIC BLOOD PRESSURE: 133 MMHG | HEIGHT: 64 IN | OXYGEN SATURATION: 95 % | RESPIRATION RATE: 14 BRPM

## 2021-12-06 PROCEDURE — 99396 PREV VISIT EST AGE 40-64: CPT

## 2021-12-06 NOTE — HISTORY OF PRESENT ILLNESS
[de-identified] : PT COMES FOR CPE \par SEEN BY NEURO WHO REFER TO PT \par SEEN BY PSYCH MONTHLY ,ON CLONAZEPAM 0.5 MG DAILY PRN \par SEEN BY GYN WHO RECOMM REPEAT BONE DENSITY THAT HSE HAD 4-5 Y AGO WITH OSTEOPENIA\par HAD COLONOSCOPY 2 Y AGO

## 2021-12-06 NOTE — ASSESSMENT
[FreeTextEntry1] : CPE OF 56 Y OLD FEM WITH PMX OF HTN = STABLE \par DYSLIPIDEMIA = STABLE ON ATORVASTATIN \par PALPITATION = ON METOPROLOL\par DONALDO = AS PER PSYCH MONTHLY ON CLONAZEPAM \par OSTEOPENIA HX= BONE DENSITY ORDERED\par RTO 3 M

## 2022-01-20 NOTE — ASSESSMENT
[FreeTextEntry1] : \par \par  Apixaban/Eliquis - Compliance/Apixaban/Eliquis - Dietary Advice/Apixaban/Eliquis - Follow up monitoring/Apixaban/Eliquis - Potential for adverse drug reactions and interactions

## 2022-02-23 ENCOUNTER — RX RENEWAL (OUTPATIENT)
Age: 57
End: 2022-02-23

## 2022-03-08 ENCOUNTER — NON-APPOINTMENT (OUTPATIENT)
Age: 57
End: 2022-03-08

## 2022-03-08 ENCOUNTER — APPOINTMENT (OUTPATIENT)
Dept: HEART AND VASCULAR | Facility: CLINIC | Age: 57
End: 2022-03-08
Payer: MEDICAID

## 2022-03-08 VITALS
SYSTOLIC BLOOD PRESSURE: 137 MMHG | DIASTOLIC BLOOD PRESSURE: 83 MMHG | TEMPERATURE: 96.6 F | RESPIRATION RATE: 14 BRPM | OXYGEN SATURATION: 98 % | HEART RATE: 85 BPM

## 2022-03-08 PROCEDURE — 99214 OFFICE O/P EST MOD 30 MIN: CPT

## 2022-03-08 NOTE — PHYSICAL EXAM
[Well Developed] : well developed [Well Nourished] : well nourished [No Acute Distress] : no acute distress [Normal Conjunctiva] : normal conjunctiva [Normal Venous Pressure] : normal venous pressure [No Carotid Bruit] : no carotid bruit [Normal S1, S2] : normal S1, S2 [No Murmur] : no murmur [No Rub] : no rub [No Gallop] : no gallop [Clear Lung Fields] : clear lung fields [Good Air Entry] : good air entry [No Respiratory Distress] : no respiratory distress  [Soft] : abdomen soft [Non Tender] : non-tender [No Masses/organomegaly] : no masses/organomegaly [Normal Bowel Sounds] : normal bowel sounds [Normal Gait] : normal gait [No Edema] : no edema [No Cyanosis] : no cyanosis [No Clubbing] : no clubbing [No Varicosities] : no varicosities [No Rash] : no rash [No Skin Lesions] : no skin lesions [Moves all extremities] : moves all extremities [No Focal Deficits] : no focal deficits [Normal Speech] : normal speech [Alert and Oriented] : alert and oriented [Normal memory] : normal memory [General Appearance - Well Developed] : well developed [Normal Appearance] : normal appearance [Well Groomed] : well groomed [General Appearance - Well Nourished] : well nourished [No Deformities] : no deformities [General Appearance - In No Acute Distress] : no acute distress [Heart Rate And Rhythm] : heart rate and rhythm were normal [Heart Sounds] : normal S1 and S2 [Murmurs] : no murmurs present [Respiration, Rhythm And Depth] : normal respiratory rhythm and effort [Exaggerated Use Of Accessory Muscles For Inspiration] : no accessory muscle use [Auscultation Breath Sounds / Voice Sounds] : lungs were clear to auscultation bilaterally [Abdomen Soft] : soft [Abdomen Tenderness] : non-tender [Abdomen Mass (___ Cm)] : no abdominal mass palpated [Abnormal Walk] : normal gait [Gait - Sufficient For Exercise Testing] : the gait was sufficient for exercise testing [Nail Clubbing] : no clubbing of the fingernails [Cyanosis, Localized] : no localized cyanosis [Petechial Hemorrhages (___cm)] : no petechial hemorrhages [] : no ischemic changes

## 2022-03-08 NOTE — ASSESSMENT
[FreeTextEntry1] : 56 yo F with PMH pre DM, anxiety, palpitations here for followup.\par \par HTN\par -borderline today\par -continue with toprol XL 25 mg daily  \par -continue with lisinopril to 10 mg daily\par -Recommend to monitor blood pressure at home and keep BP log\par -Call clinic if blood pressure is persistently > 140/90\par -We will consider uptitrating lisinopril to 20 mg daily\par -repeat echo today \par -will consider stress test if onset of exertional symptoms or any abnormalities on echo \par \par PALPITATIONS\par -symptoms  resolved after starting beta blocker, and anxiolytics \par -the patient reported  that symptoms are only in the setting of anxiety/panic attack\par -holter monitor 1/2021 wnl \par -recc continuing to follow up with PMD re: anxiolytic therapy\par -recommended to continue to  take Toprol XL 25 mg daily \par -echo 10/2020 wnl\par -nuc stress test 2019  normal\par -Cr 0.60 6/6/2021, TSH 2.24 12/2020\par \par HLD\par -the patient does not want to start statin\par -repeat lipid panel in three months, recc healthy diet and exercise\par -Recc healthy diet and exercise \par \par \par f/u in 2 weeks for echo and BP check

## 2022-03-08 NOTE — HISTORY OF PRESENT ILLNESS
[FreeTextEntry1] : 58 yo F with PMH pre DM, anxiety, palpitations here for followup \par The patient reports BP not optimally controlled at home with episodes of BP readings in 150s alternating with normal BP\par The patient also reports generalized body aches. mostly musculoskeletal in shoulder and  back\par She never started taking the statin \par \par \par PMH\par HTN\par anxiety/panic attacks\par palpitations\par pre DM\par \par ALL\par NKDA\par \par MEDS\par toprol XL 25 mg daily \par venlafaxine \par calcium \par vitamins\par lisinopril 10 mg daily\par meclizine\par \par FH\par grandfather with MI\par \par SH\par no smoke, no etoh, no drugs\par \par EKG 3/18/2022, sinus rhythm, nonspecific ST changes\par EKG 9/24/2019 SR, TWI V1-V3\par EKG 10/1/2020 SB, diffuse ST depression, negative T waves\par EKG 10/14/2021 SR, non specific T wave abnormalities\par \par NUC stress test 10/10/2019 negative for ischemia , normal LVEF\par \par ECHOCARDIOGRAM 11/6/2020\par normal LV size and function (EF 60-65% )\par \par \par HOLTER 1/21/2021 ( 7 days) \par SR\par SB at night \par 1 episode of three beats atach at 4 am (asymptomatic)

## 2022-04-07 LAB
CHOLEST SERPL-MCNC: 208 MG/DL
HDLC SERPL-MCNC: 43 MG/DL
LDLC SERPL CALC-MCNC: 122 MG/DL
NONHDLC SERPL-MCNC: 165 MG/DL
TRIGL SERPL-MCNC: 215 MG/DL

## 2022-04-22 NOTE — PHYSICAL EXAM
[Well Developed] : well developed [Well Nourished] : well nourished [No Acute Distress] : no acute distress [Normal Conjunctiva] : normal conjunctiva [Normal Venous Pressure] : normal venous pressure [No Carotid Bruit] : no carotid bruit [Normal S1, S2] : normal S1, S2 [No Murmur] : no murmur [No Rub] : no rub [No Gallop] : no gallop [Clear Lung Fields] : clear lung fields [Good Air Entry] : good air entry [No Respiratory Distress] : no respiratory distress  [Soft] : abdomen soft [Non Tender] : non-tender [No Masses/organomegaly] : no masses/organomegaly [Normal Bowel Sounds] : normal bowel sounds [Normal Gait] : normal gait [No Edema] : no edema [No Cyanosis] : no cyanosis [No Clubbing] : no clubbing [No Varicosities] : no varicosities [No Rash] : no rash [No Skin Lesions] : no skin lesions [Moves all extremities] : moves all extremities [No Focal Deficits] : no focal deficits [Normal Speech] : normal speech [Alert and Oriented] : alert and oriented [Normal memory] : normal memory [General Appearance - Well Developed] : well developed [Normal Appearance] : normal appearance [Well Groomed] : well groomed [General Appearance - Well Nourished] : well nourished [No Deformities] : no deformities [General Appearance - In No Acute Distress] : no acute distress [Heart Rate And Rhythm] : heart rate and rhythm were normal [Heart Sounds] : normal S1 and S2 [Murmurs] : no murmurs present [Respiration, Rhythm And Depth] : normal respiratory rhythm and effort [Exaggerated Use Of Accessory Muscles For Inspiration] : no accessory muscle use [Auscultation Breath Sounds / Voice Sounds] : lungs were clear to auscultation bilaterally [Abdomen Soft] : soft [Abdomen Tenderness] : non-tender [Abdomen Mass (___ Cm)] : no abdominal mass palpated [Gait - Sufficient For Exercise Testing] : the gait was sufficient for exercise testing [Abnormal Walk] : normal gait [Nail Clubbing] : no clubbing of the fingernails [Cyanosis, Localized] : no localized cyanosis [Petechial Hemorrhages (___cm)] : no petechial hemorrhages [] : no ischemic changes

## 2022-04-25 ENCOUNTER — APPOINTMENT (OUTPATIENT)
Dept: HEART AND VASCULAR | Facility: CLINIC | Age: 57
End: 2022-04-25
Payer: MEDICAID

## 2022-04-25 VITALS
WEIGHT: 152 LBS | TEMPERATURE: 98.9 F | HEART RATE: 82 BPM | BODY MASS INDEX: 25.95 KG/M2 | DIASTOLIC BLOOD PRESSURE: 80 MMHG | RESPIRATION RATE: 12 BRPM | OXYGEN SATURATION: 98 % | HEIGHT: 64 IN | SYSTOLIC BLOOD PRESSURE: 160 MMHG

## 2022-04-25 VITALS — SYSTOLIC BLOOD PRESSURE: 149 MMHG | DIASTOLIC BLOOD PRESSURE: 82 MMHG

## 2022-04-25 VITALS — SYSTOLIC BLOOD PRESSURE: 154 MMHG | DIASTOLIC BLOOD PRESSURE: 80 MMHG

## 2022-04-25 PROCEDURE — 99214 OFFICE O/P EST MOD 30 MIN: CPT

## 2022-04-25 NOTE — ASSESSMENT
[FreeTextEntry1] : 56 yo F with PMH pre DM, anxiety, palpitations here for followup.\par \par HTN\par -Not optimally controlled today but the patient reports being very anxious\par -Blood pressure at home on blood pressure log is very well controlled (the patient checks blood pressure twice daily)\par -continue with toprol XL 25 mg daily  \par -continue with lisinopril to 10 mg daily\par -Recommend to monitor blood pressure at home and keep BP log\par -Call clinic if blood pressure is persistently > 140/90\par -We will consider uptitrating lisinopril to 20 mg daily\par -Echocardiogram 4/2022 within normal limits\par \par PALPITATIONS\par -symptoms  resolved after starting beta blocker, and anxiolytics \par -the patient reported  that symptoms are only in the setting of anxiety/panic attack\par -holter monitor 1/2021 wnl \par -recc continuing to follow up with PMD re: anxiolytic therapy\par -recommended to continue to  take Toprol XL 25 mg daily \par -echo 10/2020 wnl\par -nuc stress test 2019  normal\par -Cr 0.60 6/6/2021, TSH 2.24 12/2020\par \par HLD\par -the patient does not want to start statin\par -repeat lipid panel in three months, recc healthy diet and exercise\par -Recc healthy diet and exercise \par \par \par f/u in 4 months or sooner if any symptoms

## 2022-04-25 NOTE — HISTORY OF PRESENT ILLNESS
[FreeTextEntry1] : 58 yo F with PMH pre DM, anxiety, palpitations here for follow up, BP check and echo results\par The patient reports feeling fine, denies any chest pain, shortness of breath, palpitations, syncope, presyncope.\par She brings a blood pressure log at home with blood pressure ranging from 110-127/70-80.\par She reports compliance with her lisinopril 10 mg daily and metoprolol XL 25 mg daily\par \par \par PMH\par HTN\par anxiety/panic attacks\par palpitations\par pre DM\par \par ALL\par NKDA\par \par MEDS\par toprol XL 25 mg daily \par venlafaxine \par calcium \par vitamins\par lisinopril 10 mg daily\par meclizine\par \par FH\par grandfather with MI\par \par SH\par no smoke, no etoh, no drugs\par \par EKG 3/18/2022, sinus rhythm, nonspecific ST changes\par EKG 9/24/2019 SR, TWI V1-V3\par EKG 10/1/2020 SB, diffuse ST depression, negative T waves\par EKG 10/14/2021 SR, non specific T wave abnormalities\par \par NUC stress test 10/10/2019 negative for ischemia , normal LVEF\par \par Echocardiogram 4/20/2022\par LVEF 60%\par Trace mitral insufficiency\par Normal left ventricular size and function\par \par ECHOCARDIOGRAM 11/6/2020\par normal LV size and function (EF 60-65% )\par \par \par HOLTER 1/21/2021 ( 7 days) \par SR\par SB at night \par 1 episode of three beats atach at 4 am (asymptomatic)

## 2022-04-26 ENCOUNTER — RX RENEWAL (OUTPATIENT)
Age: 57
End: 2022-04-26

## 2022-06-01 ENCOUNTER — APPOINTMENT (OUTPATIENT)
Dept: INTERNAL MEDICINE | Facility: CLINIC | Age: 57
End: 2022-06-01
Payer: MEDICAID

## 2022-06-01 VITALS — SYSTOLIC BLOOD PRESSURE: 125 MMHG | DIASTOLIC BLOOD PRESSURE: 75 MMHG

## 2022-06-01 VITALS
WEIGHT: 150 LBS | DIASTOLIC BLOOD PRESSURE: 80 MMHG | OXYGEN SATURATION: 97 % | BODY MASS INDEX: 25.61 KG/M2 | RESPIRATION RATE: 14 BRPM | HEART RATE: 74 BPM | HEIGHT: 64 IN | SYSTOLIC BLOOD PRESSURE: 127 MMHG

## 2022-06-01 DIAGNOSIS — M25.511 PAIN IN RIGHT SHOULDER: ICD-10-CM

## 2022-06-01 PROCEDURE — 99214 OFFICE O/P EST MOD 30 MIN: CPT

## 2022-06-01 RX ORDER — ATORVASTATIN CALCIUM 10 MG/1
10 TABLET, FILM COATED ORAL DAILY
Qty: 90 | Refills: 1 | Status: DISCONTINUED | COMMUNITY
Start: 2021-11-03 | End: 2022-06-01

## 2022-06-01 RX ORDER — LORATADINE 10 MG
17 TABLET,DISINTEGRATING ORAL DAILY
Qty: 1 | Refills: 3 | Status: DISCONTINUED | COMMUNITY
Start: 2021-06-04 | End: 2022-06-01

## 2022-06-01 RX ORDER — CLONAZEPAM 0.5 MG/1
0.5 TABLET ORAL
Refills: 0 | Status: ACTIVE | COMMUNITY

## 2022-06-01 NOTE — ASSESSMENT
[FreeTextEntry1] : 57 Y OLD FEM WITH PMX OF HTN ,IFG NAD DYSLIPIDEMIA = LABS TODAY \par DONALDO = AS PER PSYCH \par R SHOULDER ACUTE ARTHRALGIA = XR AND ORTHO EVAL

## 2022-06-01 NOTE — HISTORY OF PRESENT ILLNESS
[de-identified] : CC OF R SHOULDER PAIN FOR 2 DAYS ,WITH DECREASED ROM IN ALL DIRECTIONS ;WORSE AT NIGHT ;NO TRAUMA OR TRIGGER ;TOOK ACETAMINOPHEN WITH SOME RELIEVE \par OFF ATORVASTATIN DUE TO ARTHRALGIAS MONTHS AGO \par OFF METOPROLOL DUE TO LOW BP READINGS \par SEEN BY PSYCH MONTHLY AND BY THERAPIST WEEKLY

## 2022-06-01 NOTE — PHYSICAL EXAM
[Coordination Grossly Intact] : coordination grossly intact [No Focal Deficits] : no focal deficits [Normal] : affect was normal and insight and judgment were intact [de-identified] : DECREASED ROM R SHOULDER DUE TO AIN ,NON TENDER TO TOUCH

## 2022-06-02 LAB
CHOLEST SERPL-MCNC: 199 MG/DL
ESTIMATED AVERAGE GLUCOSE: 128 MG/DL
HBA1C MFR BLD HPLC: 6.1 %
HDLC SERPL-MCNC: 40 MG/DL
LDLC SERPL CALC-MCNC: 114 MG/DL
NONHDLC SERPL-MCNC: 158 MG/DL
TRIGL SERPL-MCNC: 222 MG/DL

## 2022-06-09 LAB
ALBUMIN SERPL ELPH-MCNC: 4.7 G/DL
ALP BLD-CCNC: 97 U/L
ALT SERPL-CCNC: 19 U/L
ANION GAP SERPL CALC-SCNC: 13 MMOL/L
AST SERPL-CCNC: 22 U/L
BILIRUB SERPL-MCNC: 0.2 MG/DL
BUN SERPL-MCNC: 14 MG/DL
CALCIUM SERPL-MCNC: 9.1 MG/DL
CHLORIDE SERPL-SCNC: 105 MMOL/L
CO2 SERPL-SCNC: 23 MMOL/L
CREAT SERPL-MCNC: 0.56 MG/DL
EGFR: 106 ML/MIN/1.73M2
GLUCOSE SERPL-MCNC: 135 MG/DL
POTASSIUM SERPL-SCNC: 4.2 MMOL/L
PROT SERPL-MCNC: 7.3 G/DL
SODIUM SERPL-SCNC: 142 MMOL/L

## 2022-06-10 ENCOUNTER — NON-APPOINTMENT (OUTPATIENT)
Age: 57
End: 2022-06-10

## 2022-08-01 ENCOUNTER — APPOINTMENT (OUTPATIENT)
Dept: HEART AND VASCULAR | Facility: CLINIC | Age: 57
End: 2022-08-01

## 2022-09-12 ENCOUNTER — APPOINTMENT (OUTPATIENT)
Dept: INTERNAL MEDICINE | Facility: CLINIC | Age: 57
End: 2022-09-12

## 2022-09-12 VITALS
OXYGEN SATURATION: 99 % | DIASTOLIC BLOOD PRESSURE: 78 MMHG | WEIGHT: 149 LBS | HEIGHT: 64 IN | TEMPERATURE: 98.3 F | BODY MASS INDEX: 25.44 KG/M2 | HEART RATE: 82 BPM | SYSTOLIC BLOOD PRESSURE: 131 MMHG | RESPIRATION RATE: 16 BRPM

## 2022-09-12 DIAGNOSIS — K21.9 GASTRO-ESOPHAGEAL REFLUX DISEASE W/OUT ESOPHAGITIS: ICD-10-CM

## 2022-09-12 PROCEDURE — 99215 OFFICE O/P EST HI 40 MIN: CPT

## 2022-09-12 RX ORDER — ZOSTER VACCINE RECOMBINANT, ADJUVANTED 50 MCG/0.5
50 KIT INTRAMUSCULAR
Qty: 1 | Refills: 1 | Status: ACTIVE | COMMUNITY
Start: 2022-09-12 | End: 1900-01-01

## 2022-09-12 NOTE — ASSESSMENT
[FreeTextEntry1] : 57 Y OLD FEM WITH RECURRENT LOWER BACK PAIN AND R SHOULDER ARTHRALGIA = PT AND OTC MEDS ;STOP COMBO RX FROM [place] \par BPV = AS PER NEUROLOGY \par DONALDO AND INSOMNIA = F/U PSYCH \par HTN ,PALPITATIONS= CARDIOLOGY F/U

## 2022-09-12 NOTE — HISTORY OF PRESENT ILLNESS
[de-identified] : CC OF R SHOULDER PAIN ON /OFF;CC OF LOWER BACK PAIN ON /OFF ,WHILE IN [place] OVER 1 M AGO GIVEN COMBO TAB WITH DEXAMETHASONE ,INDOMETHACIN AND MUSCLE RELAXANTS \par CC OF PALPITATIONS AND PARESTHESIAS UPPER EXTR \par CC OF POSITIONAL VERTIGO ON /OFF= SEEN BY NEUROLOGY IN THE PAST \par CC OF ANXIETY AND INSOMNIA = HAS DEAN TO SEE PSYCH SOON ,ON CLONAZEPAM

## 2022-09-12 NOTE — PHYSICAL EXAM
[Normal] : soft, non-tender, non-distended, no masses palpated, no HSM and normal bowel sounds [Normal Mental Status] : the patient's orientation, memory, attention, language and fund of knowledge were normal [Anxious] : anxious [Labile] : labile [Impaired judgment] : intact judgment [Impaired Insight] : intact insight

## 2022-09-12 NOTE — REVIEW OF SYSTEMS
[Palpitations] : palpitations [Abdominal Pain] : abdominal pain [Constipation] : constipation [Joint Pain] : joint pain [Muscle Pain] : muscle pain [Back Pain] : back pain [Dizziness] : dizziness [Insomnia] : insomnia [Anxiety] : anxiety [Negative] : Heme/Lymph

## 2022-09-13 LAB
ALBUMIN SERPL ELPH-MCNC: 5.2 G/DL
ALP BLD-CCNC: 101 U/L
ALT SERPL-CCNC: 35 U/L
ANION GAP SERPL CALC-SCNC: 15 MMOL/L
AST SERPL-CCNC: 32 U/L
BILIRUB SERPL-MCNC: 0.3 MG/DL
BUN SERPL-MCNC: 11 MG/DL
CALCIUM SERPL-MCNC: 9.8 MG/DL
CHLORIDE SERPL-SCNC: 103 MMOL/L
CO2 SERPL-SCNC: 22 MMOL/L
CREAT SERPL-MCNC: 0.59 MG/DL
EGFR: 105 ML/MIN/1.73M2
GLUCOSE SERPL-MCNC: 77 MG/DL
POTASSIUM SERPL-SCNC: 4.8 MMOL/L
PROT SERPL-MCNC: 7.6 G/DL
SODIUM SERPL-SCNC: 141 MMOL/L

## 2022-09-14 LAB
ESTIMATED AVERAGE GLUCOSE: 143 MG/DL
HBA1C MFR BLD HPLC: 6.6 %

## 2022-09-16 LAB
CHOLEST SERPL-MCNC: 239 MG/DL
HDLC SERPL-MCNC: 49 MG/DL
LDLC SERPL CALC-MCNC: 151 MG/DL
NONHDLC SERPL-MCNC: 191 MG/DL
TRIGL SERPL-MCNC: 197 MG/DL

## 2022-10-06 ENCOUNTER — APPOINTMENT (OUTPATIENT)
Dept: HEART AND VASCULAR | Facility: CLINIC | Age: 57
End: 2022-10-06

## 2022-10-06 VITALS
WEIGHT: 150 LBS | HEIGHT: 64 IN | RESPIRATION RATE: 16 BRPM | TEMPERATURE: 97.6 F | SYSTOLIC BLOOD PRESSURE: 160 MMHG | DIASTOLIC BLOOD PRESSURE: 80 MMHG | BODY MASS INDEX: 25.61 KG/M2 | OXYGEN SATURATION: 98 % | HEART RATE: 60 BPM

## 2022-10-06 PROCEDURE — 99214 OFFICE O/P EST MOD 30 MIN: CPT

## 2022-10-06 RX ORDER — METOPROLOL SUCCINATE 25 MG/1
25 TABLET, EXTENDED RELEASE ORAL DAILY
Qty: 30 | Refills: 3 | Status: DISCONTINUED | COMMUNITY
Start: 2019-10-17 | End: 2022-10-06

## 2022-10-06 RX ORDER — ASPIRIN 81 MG/1
81 TABLET, COATED ORAL DAILY
Qty: 30 | Refills: 0 | Status: DISCONTINUED | COMMUNITY
Start: 2020-06-11 | End: 2022-10-06

## 2022-10-06 NOTE — REVIEW OF SYSTEMS
[Anxiety] : anxiety [Fever] : no fever [Headache] : no headache [Chills] : no chills [Feeling Fatigued] : not feeling fatigued [SOB] : no shortness of breath [Dyspnea on exertion] : not dyspnea during exertion [Chest Discomfort] : no chest discomfort [Lower Ext Edema] : no extremity edema [Leg Claudication] : no intermittent leg claudication [Palpitations] : no palpitations [Orthopnea] : no orthopnea [PND] : no PND [Syncope] : no syncope [Cough] : no cough [Wheezing] : no wheezing [Coughing Up Blood] : no hemoptysis [Dizziness] : no dizziness [Convulsions] : no convulsions [Limb Weakness (Paresis)] : no limb weakness (Paresis) [Confusion] : no confusion was observed [Under Stress] : not under stress [Suicidal] : not suicidal [Easy Bleeding] : no tendency for easy bleeding

## 2022-10-06 NOTE — HISTORY OF PRESENT ILLNESS
[FreeTextEntry1] : TORI NATARAJAN is a 57 year y.o. F with medical history significant for pre DM, anxiety with panic attacks, palpitations\par She is here for cardiac evaluation. \par BP is elevated today. However, she brings her home BP readings from the past 2 weeks that range 107-122/70-80 mmHg. \par She states that she has white coat. \par She states she only has palpitations when she is anxious or having a panic attack, otherwise she is fine. \par \par The patient reports feeling fine, denies any chest pain, shortness of breath, palpitations, syncope, presyncope.\par She brings a blood pressure log at home with blood pressure ranging from 110-127 / 70-80 mmHg.\par \par \par Denies CP, SOB, palpitations, orthopnea, dizziness, syncope, LH, MONAHAN\par Patient denies changes in her exercise tolerance during the past 6 months. She can walk 10 blocks and can climb 3 flights of stairs. \par Sleeps with 1 pillow\par \par She denies history of MI, CVA\par Denies family history of premature ASCVD and /or SCD\par \par No hospitalizations in the past 3 years.\par \par \par \par \par PMH\par HTN\par anxiety/panic attacks\par palpitations\par pre DM\par \par ALL\par NKDA\par \par MEDS\par toprol XL 25 mg daily \par venlafaxine \par calcium \par vitamins\par lisinopril 10 mg daily\par meclizine\par \par FH\par grandfather with MI\par \par SH\par no smoke, no etoh, no drugs\par \par EKG 3/18/2022, sinus rhythm, nonspecific ST changes\par EKG 9/24/2019 SR, TWI V1-V3\par EKG 10/1/2020 SB, diffuse ST depression, negative T waves\par EKG 10/14/2021 SR, non specific T wave abnormalities\par \par NUC stress test 10/10/2019 negative for ischemia , normal LVEF\par \par Echocardiogram 4/20/2022\par LVEF 60%\par Trace mitral insufficiency\par Normal left ventricular size and function\par \par ECHOCARDIOGRAM 11/6/2020\par normal LV size and function (EF 60-65% )\par \par \par HOLTER 1/21/2021 ( 7 days) \par SR\par SB at night \par 1 episode of three beats atach at 4 am (asymptomatic) \par \par LABS (9/12/22): ; ; HDL 49; ; NonHDL 191; K 4.8; Cre 0.59; LFTs wnl; eGFR 105; A1c 6.6\par (6/2/22): ; ; ; HDL 40; NonHDL 158\par \par

## 2022-10-06 NOTE — PHYSICAL EXAM
[Well Developed] : well developed [Well Nourished] : well nourished [No Acute Distress] : no acute distress [Normal Venous Pressure] : normal venous pressure [No Carotid Bruit] : no carotid bruit [Normal S1, S2] : normal S1, S2 [No Murmur] : no murmur [No Rub] : no rub [No Gallop] : no gallop [Clear Lung Fields] : clear lung fields [Good Air Entry] : good air entry [No Respiratory Distress] : no respiratory distress  [Normal Gait] : normal gait [No Edema] : no edema [No Cyanosis] : no cyanosis [No Clubbing] : no clubbing [Moves all extremities] : moves all extremities [No Focal Deficits] : no focal deficits [Normal Speech] : normal speech [Alert and Oriented] : alert and oriented [Normal memory] : normal memory

## 2022-10-06 NOTE — ASSESSMENT
[FreeTextEntry1] : TORI NATARAJAN is a 57 year F with past medical history significant for pre DM, anxiety disorder with panic attacks, palpitations. She is here for cardiac evaluation. She has HTN with a component of white coat HTN. She has no cardiac complaints at this time. She reports good exercise tolerance. \par - I reviewed labs from 9/12/22 .  A1c has increased to 6.6. \par - Recommend to monitor blood pressure at home and keep BP log\par - start atorvastatin 20 mg daily \par - continue taking other meds \par - Increase ambulation as tolerated to aim for approximately 30 minutes of moderate activity 5 days a week.  Heart healthy diet low in sodium, sugars and saturated fats and high in fruits, vegetables and whole grains.  Weight loss.\par \par \par

## 2022-11-11 ENCOUNTER — LABORATORY RESULT (OUTPATIENT)
Age: 57
End: 2022-11-11

## 2022-11-11 DIAGNOSIS — M54.9 DORSALGIA, UNSPECIFIED: ICD-10-CM

## 2022-12-08 ENCOUNTER — APPOINTMENT (OUTPATIENT)
Dept: HEART AND VASCULAR | Facility: CLINIC | Age: 57
End: 2022-12-08

## 2022-12-08 PROCEDURE — 36415 COLL VENOUS BLD VENIPUNCTURE: CPT

## 2022-12-09 LAB
24R-OH-CALCIDIOL SERPL-MCNC: 53.2 PG/ML
ALBUMIN SERPL ELPH-MCNC: 4.6 G/DL
ALP BLD-CCNC: 99 U/L
ALT SERPL-CCNC: 22 U/L
ANION GAP SERPL CALC-SCNC: 10 MMOL/L
APPEARANCE: CLEAR
AST SERPL-CCNC: 21 U/L
BASOPHILS # BLD AUTO: 0.06 K/UL
BASOPHILS NFR BLD AUTO: 0.8 %
BILIRUB SERPL-MCNC: 0.3 MG/DL
BILIRUBIN URINE: NEGATIVE
BLOOD URINE: NEGATIVE
BUN SERPL-MCNC: 11 MG/DL
CALCIUM SERPL-MCNC: 9.5 MG/DL
CHLORIDE SERPL-SCNC: 104 MMOL/L
CHOLEST SERPL-MCNC: 208 MG/DL
CO2 SERPL-SCNC: 26 MMOL/L
COLOR: NORMAL
CREAT SERPL-MCNC: 0.63 MG/DL
EGFR: 103 ML/MIN/1.73M2
EOSINOPHIL # BLD AUTO: 0.17 K/UL
EOSINOPHIL NFR BLD AUTO: 2.4 %
GLUCOSE QUALITATIVE U: NEGATIVE
GLUCOSE SERPL-MCNC: 96 MG/DL
HCT VFR BLD CALC: 43.4 %
HDLC SERPL-MCNC: 46 MG/DL
HGB BLD-MCNC: 13.3 G/DL
IMM GRANULOCYTES NFR BLD AUTO: 0.3 %
KETONES URINE: NEGATIVE
LDLC SERPL CALC-MCNC: 137 MG/DL
LEUKOCYTE ESTERASE URINE: ABNORMAL
LYMPHOCYTES # BLD AUTO: 2.84 K/UL
LYMPHOCYTES NFR BLD AUTO: 39.9 %
MAN DIFF?: NORMAL
MCHC RBC-ENTMCNC: 30.6 GM/DL
MCHC RBC-ENTMCNC: 31.5 PG
MCV RBC AUTO: 102.8 FL
MONOCYTES # BLD AUTO: 0.42 K/UL
MONOCYTES NFR BLD AUTO: 5.9 %
NEUTROPHILS # BLD AUTO: 3.6 K/UL
NEUTROPHILS NFR BLD AUTO: 50.7 %
NITRITE URINE: NEGATIVE
NONHDLC SERPL-MCNC: 162 MG/DL
PH URINE: 7
PLATELET # BLD AUTO: 305 K/UL
POTASSIUM SERPL-SCNC: 4.2 MMOL/L
PROT SERPL-MCNC: 7.2 G/DL
PROTEIN URINE: NEGATIVE
RBC # BLD: 4.22 M/UL
RBC # FLD: 13.8 %
SODIUM SERPL-SCNC: 141 MMOL/L
SPECIFIC GRAVITY URINE: 1.02
TRIGL SERPL-MCNC: 128 MG/DL
TSH SERPL-ACNC: 1.94 UIU/ML
UROBILINOGEN URINE: NORMAL
WBC # FLD AUTO: 7.11 K/UL

## 2022-12-12 ENCOUNTER — APPOINTMENT (OUTPATIENT)
Dept: INTERNAL MEDICINE | Facility: CLINIC | Age: 57
End: 2022-12-12

## 2022-12-12 VITALS
SYSTOLIC BLOOD PRESSURE: 149 MMHG | OXYGEN SATURATION: 97 % | DIASTOLIC BLOOD PRESSURE: 84 MMHG | RESPIRATION RATE: 14 BRPM | HEART RATE: 87 BPM | WEIGHT: 149 LBS | BODY MASS INDEX: 25.44 KG/M2 | TEMPERATURE: 98.5 F | HEIGHT: 64 IN

## 2022-12-12 VITALS — DIASTOLIC BLOOD PRESSURE: 75 MMHG | SYSTOLIC BLOOD PRESSURE: 120 MMHG

## 2022-12-12 PROCEDURE — 99396 PREV VISIT EST AGE 40-64: CPT

## 2022-12-12 RX ORDER — ATORVASTATIN CALCIUM 20 MG/1
20 TABLET, FILM COATED ORAL
Qty: 30 | Refills: 3 | Status: DISCONTINUED | COMMUNITY
Start: 2022-10-06 | End: 2022-12-12

## 2022-12-12 NOTE — ASSESSMENT
[FreeTextEntry1] : CPE OF 57 Y OLD FEM WITH PM,X OF HTN ,DM ,DYSLIPIDEMIA = LABS REVIEWED ,HBA1C ORDERED\par DECREASE LISINOPRIL FROM 10 TO 5 AND CHANGE ATORVA 20 TO ROSUVASTATIN 5 MG DAILY \par LABS AND RTO 2 M

## 2022-12-12 NOTE — REVIEW OF SYSTEMS
[Joint Pain] : joint pain [Muscle Pain] : muscle pain [Insomnia] : insomnia [Anxiety] : anxiety [Negative] : Heme/Lymph

## 2022-12-12 NOTE — HISTORY OF PRESENT ILLNESS
[de-identified] : HAD BEEN SKIPPING LISINOPRIL BECAUSE -120/70-75\par HAS BEEN SKIPPING ATORVA 20 DUE TO ARTHRALGIAS

## 2022-12-30 ENCOUNTER — APPOINTMENT (OUTPATIENT)
Dept: INTERNAL MEDICINE | Facility: CLINIC | Age: 57
End: 2022-12-30
Payer: MEDICAID

## 2022-12-30 VITALS
HEIGHT: 64 IN | RESPIRATION RATE: 15 BRPM | OXYGEN SATURATION: 98 % | HEART RATE: 83 BPM | SYSTOLIC BLOOD PRESSURE: 130 MMHG | BODY MASS INDEX: 24.75 KG/M2 | DIASTOLIC BLOOD PRESSURE: 83 MMHG | WEIGHT: 145 LBS | TEMPERATURE: 98.4 F

## 2022-12-30 DIAGNOSIS — J06.9 ACUTE UPPER RESPIRATORY INFECTION, UNSPECIFIED: ICD-10-CM

## 2022-12-30 PROCEDURE — 99214 OFFICE O/P EST MOD 30 MIN: CPT

## 2022-12-30 NOTE — PHYSICAL EXAM
[Normal Outer Ear/Nose] : the outer ears and nose were normal in appearance [Normal] : no carotid or abdominal bruits heard, no varicosities, pedal pulses are present, no peripheral edema, no extremity clubbing or cyanosis and no palpable aorta

## 2022-12-30 NOTE — HISTORY OF PRESENT ILLNESS
[FreeTextEntry8] : CC OF URI SX FOR 3 DAYS ,COVID-19 PCR NEG TODAY \par CC OF DRY COUGH ,RUNNY NOSE ,PND ,SNEEZING AND CLOGGED EARS

## 2022-12-30 NOTE — ASSESSMENT
[FreeTextEntry1] : ACUTE VISIT OF 57 Y OLD FEM WHO PRESENTS WITH URI SX FOR 3 DAYS = PROMETHAZINE DM AND ATROVENT NASAL INH RX ,TAKE ACETAMINOPHEN TID \par POCT STREP AND INFLUENZA NEG

## 2023-03-20 ENCOUNTER — LABORATORY RESULT (OUTPATIENT)
Age: 58
End: 2023-03-20

## 2023-03-20 ENCOUNTER — APPOINTMENT (OUTPATIENT)
Dept: INTERNAL MEDICINE | Facility: CLINIC | Age: 58
End: 2023-03-20
Payer: MEDICAID

## 2023-03-20 ENCOUNTER — NON-APPOINTMENT (OUTPATIENT)
Age: 58
End: 2023-03-20

## 2023-03-20 VITALS
HEART RATE: 71 BPM | DIASTOLIC BLOOD PRESSURE: 82 MMHG | RESPIRATION RATE: 14 BRPM | TEMPERATURE: 97.1 F | BODY MASS INDEX: 25.27 KG/M2 | WEIGHT: 148 LBS | HEIGHT: 64 IN | OXYGEN SATURATION: 99 % | SYSTOLIC BLOOD PRESSURE: 136 MMHG

## 2023-03-20 PROCEDURE — 99214 OFFICE O/P EST MOD 30 MIN: CPT | Mod: 25

## 2023-03-20 PROCEDURE — 93000 ELECTROCARDIOGRAM COMPLETE: CPT

## 2023-03-20 RX ORDER — DESLORATADINE 5 MG/1
5 TABLET ORAL
Qty: 10 | Refills: 0 | Status: DISCONTINUED | COMMUNITY
Start: 2022-12-30 | End: 2023-03-20

## 2023-03-20 RX ORDER — IPRATROPIUM BROMIDE 42 UG/1
0.06 SPRAY NASAL 3 TIMES DAILY
Qty: 1 | Refills: 0 | Status: DISCONTINUED | COMMUNITY
Start: 2022-12-30 | End: 2023-03-20

## 2023-03-20 RX ORDER — PROMETHAZINE HYDROCHLORIDE AND DEXTROMETHORPHAN HYDROBROMIDE ORAL SOLUTION 15; 6.25 MG/5ML; MG/5ML
6.25-15 SOLUTION ORAL
Qty: 1 | Refills: 0 | Status: DISCONTINUED | COMMUNITY
Start: 2022-12-30 | End: 2023-03-20

## 2023-03-20 NOTE — ASSESSMENT
[FreeTextEntry1] : 58 Y OLD FEM WITH PMX OF DONALDO= F/IU PSYCH \par HTN = STABLE \par DYSLIPIDEMIA = LABS \par PALPITATIONS = EKG \par RTO 3 M OR WHEN RETURNING FROM [place] 8/23(SHE IS GOING TO GET  THERE)

## 2023-03-20 NOTE — REVIEW OF SYSTEMS
[Chills] : chills [Palpitations] : palpitations [Insomnia] : insomnia [Anxiety] : anxiety [Negative] : Heme/Lymph [FreeTextEntry9] : SEE HPI [de-identified] : SEE HPI

## 2023-03-20 NOTE — HISTORY OF PRESENT ILLNESS
[de-identified] : COMES FOR F/U \par WAS IN [place] TILL 3 WEEKS AGO \par CC OF SPORADIC PALPITATIONS MOSTLY IN AM ;SELF RESOLVES \par CC OF INCREASING ANXIETY ;SEEN BY PSYCH ,WILL START PSYCHOTHERAPY \par CC OF CRAMPS OF FEET WHILE IN BED \par CC OF LLE PARESTHESIAS LAST WEEK WHILE IN BED \par CC OF RUE SINGLE PARESTHESIA OVER 1 WEEK AGO\par CC OF R RIB CAGE PAIN AFTER TURNING TRUNK ABOUT 2 WEEKS AGO \par

## 2023-03-20 NOTE — PHYSICAL EXAM
[No Acute Distress] : no acute distress [Normal] : soft, non-tender, non-distended, no masses palpated, no HSM and normal bowel sounds [No CVA Tenderness] : no CVA  tenderness [No Joint Swelling] : no joint swelling [No Rash] : no rash [Alert and Oriented x3] : oriented to person, place, and time

## 2023-03-21 LAB
ALBUMIN SERPL ELPH-MCNC: 4.3 G/DL
ALP BLD-CCNC: 101 U/L
ALT SERPL-CCNC: 21 U/L
ANION GAP SERPL CALC-SCNC: 13 MMOL/L
AST SERPL-CCNC: 18 U/L
BILIRUB SERPL-MCNC: 0.4 MG/DL
BUN SERPL-MCNC: 13 MG/DL
CALCIUM SERPL-MCNC: 9.5 MG/DL
CHLORIDE SERPL-SCNC: 105 MMOL/L
CHOLEST SERPL-MCNC: 191 MG/DL
CO2 SERPL-SCNC: 23 MMOL/L
CREAT SERPL-MCNC: 0.58 MG/DL
EGFR: 105 ML/MIN/1.73M2
GLUCOSE SERPL-MCNC: 108 MG/DL
HDLC SERPL-MCNC: 49 MG/DL
LDLC SERPL CALC-MCNC: 115 MG/DL
NONHDLC SERPL-MCNC: 142 MG/DL
POTASSIUM SERPL-SCNC: 4.1 MMOL/L
PROT SERPL-MCNC: 7.1 G/DL
SODIUM SERPL-SCNC: 141 MMOL/L
TRIGL SERPL-MCNC: 136 MG/DL

## 2023-05-25 ENCOUNTER — APPOINTMENT (OUTPATIENT)
Dept: HEART AND VASCULAR | Facility: CLINIC | Age: 58
End: 2023-05-25
Payer: MEDICAID

## 2023-05-25 VITALS
RESPIRATION RATE: 16 BRPM | HEART RATE: 73 BPM | HEIGHT: 64 IN | SYSTOLIC BLOOD PRESSURE: 131 MMHG | TEMPERATURE: 97.4 F | WEIGHT: 148 LBS | DIASTOLIC BLOOD PRESSURE: 76 MMHG | OXYGEN SATURATION: 98 % | BODY MASS INDEX: 25.27 KG/M2

## 2023-05-25 PROCEDURE — 99213 OFFICE O/P EST LOW 20 MIN: CPT

## 2023-05-25 NOTE — ASSESSMENT
[FreeTextEntry1] : TORI NATARAJAN is a 58 year F with past medical history significant for pre DM, HLD, anxiety disorder with panic attacks, white coat HTN. She is here for follow-up. She has no cardiac complaints at this time. She reports good exercise tolerance. \par - I reviewed ECG 3/20/23) --> No significant change from prior \par - I reviewed labs from 3/20/23 \par - Recommend to monitor blood pressure at home and keep BP log\par - continue taking other meds \par - Increase ambulation as tolerated to aim for approximately 30 minutes of moderate activity 5 days a week.  Heart healthy diet low in sodium, sugars and saturated fats and high in fruits, vegetables and whole grains.  Weight loss.\par \par \par Patient advised to go to the nearest ED whenever any symptoms persist and/or worsens.  Patient/Family/Caregiver verbalized complete understanding of these instructions.\par \par I spent a total of 25 minutes with more than 50% spent on counseling and coordinating care.\par \par \par \par \par \par \par

## 2023-05-25 NOTE — HISTORY OF PRESENT ILLNESS
[FreeTextEntry1] : TORI NATARAJAN is a 58 year y.o. F with medical history significant for pre DM, anxiety with panic attacks, HTN\par She is here for follow-up. \par The last time I saw her was Oct 2022. \par She is overall in her usual state of health\par \par Denies CP, SOB, palpitations, orthopnea, dizziness, syncope, LH, MONAHAN, edema \par Patient denies changes in her exercise tolerance during the past 6 months. She can walk 10 blocks and can climb 3 flights of stairs. \par Sleeps with 1 pillow\par \par She denies history of MI, CVA\par Denies family history of premature ASCVD and /or SCD\par \par No hospitalizations in the past 3 years.\par \par \par PMH\par HTN\par anxiety/panic attacks\par palpitations\par pre DM\par \par ALL\par NKDA\par \par MEDS\par toprol XL 25 mg daily \par venlafaxine \par calcium \par vitamins\par lisinopril 10 mg daily\par meclizine\par \par FH\par grandfather with MI\par \par SH\par no smoke, no etoh, no drugs\par \par \par \par DATA \par EKG 3/20/23: NSR at 67 bpm w nl axis and nonspecific TW abn \par EKG 3/18/2022, sinus rhythm, nonspecific ST changes\par EKG 9/24/2019 SR, TWI V1-V3\par EKG 10/1/2020 SB, diffuse ST depression, negative T waves\par EKG 10/14/2021 SR, non specific T wave abnormalities\par \par NUC stress test 10/10/2019 negative for ischemia , normal LVEF\par \par Echocardiogram 4/20/2022\par LVEF 60%; Trace mitral insufficiency\par Normal left ventricular size and function\par \par ECHO 11/6/2020: normal LV size and function (EF 60-65%)\par \par HOLTER 1/21/2021 ( 7 days) : SB at night \par 1 episode of three beats atach at 4 am (asymptomatic) \par \par LABS(3/20/23): K 4.1, CRE 0.58, LFTs wnl, EGFR 105, , , ; HDL 49, NON-, A1C 6.5, HCT 42.3,  \par LABS (9/12/22): ; ; HDL 49; ; NonHDL 191; K 4.8; Cre 0.59; LFTs wnl; eGFR 105; A1c 6.6\par (6/2/22): ; ; ; HDL 40; NonHDL 158\par \par

## 2023-11-06 DIAGNOSIS — Z00.00 ENCOUNTER FOR GENERAL ADULT MEDICAL EXAMINATION W/OUT ABNORMAL FINDINGS: ICD-10-CM

## 2023-12-08 ENCOUNTER — APPOINTMENT (OUTPATIENT)
Dept: HEART AND VASCULAR | Facility: CLINIC | Age: 58
End: 2023-12-08
Payer: COMMERCIAL

## 2023-12-08 LAB
25(OH)D3 SERPL-MCNC: 31.6 NG/ML
ALBUMIN SERPL ELPH-MCNC: 4.5 G/DL
ALP BLD-CCNC: 105 U/L
ALT SERPL-CCNC: 23 U/L
ANION GAP SERPL CALC-SCNC: 12 MMOL/L
AST SERPL-CCNC: 21 U/L
BASOPHILS # BLD AUTO: 0.06 K/UL
BASOPHILS NFR BLD AUTO: 0.8 %
BILIRUB SERPL-MCNC: 0.5 MG/DL
BUN SERPL-MCNC: 14 MG/DL
CALCIUM SERPL-MCNC: 9.5 MG/DL
CHLORIDE SERPL-SCNC: 104 MMOL/L
CHOLEST SERPL-MCNC: 224 MG/DL
CO2 SERPL-SCNC: 25 MMOL/L
CREAT SERPL-MCNC: 0.66 MG/DL
EGFR: 102 ML/MIN/1.73M2
EOSINOPHIL # BLD AUTO: 0.15 K/UL
EOSINOPHIL NFR BLD AUTO: 1.9 %
ESTIMATED AVERAGE GLUCOSE: 128 MG/DL
GLUCOSE SERPL-MCNC: 104 MG/DL
HBA1C MFR BLD HPLC: 6.1 %
HCT VFR BLD CALC: 39.4 %
HDLC SERPL-MCNC: 48 MG/DL
HGB BLD-MCNC: 12.8 G/DL
IMM GRANULOCYTES NFR BLD AUTO: 0.3 %
LDLC SERPL CALC-MCNC: 146 MG/DL
LYMPHOCYTES # BLD AUTO: 2.96 K/UL
LYMPHOCYTES NFR BLD AUTO: 37.7 %
MAN DIFF?: NORMAL
MCHC RBC-ENTMCNC: 31.7 PG
MCHC RBC-ENTMCNC: 32.5 GM/DL
MCV RBC AUTO: 97.5 FL
MONOCYTES # BLD AUTO: 0.52 K/UL
MONOCYTES NFR BLD AUTO: 6.6 %
NEUTROPHILS # BLD AUTO: 4.14 K/UL
NEUTROPHILS NFR BLD AUTO: 52.7 %
NONHDLC SERPL-MCNC: 176 MG/DL
PLATELET # BLD AUTO: 297 K/UL
POTASSIUM SERPL-SCNC: 4.4 MMOL/L
PROT SERPL-MCNC: 7.5 G/DL
RBC # BLD: 4.04 M/UL
RBC # FLD: 13.7 %
SODIUM SERPL-SCNC: 140 MMOL/L
TRIGL SERPL-MCNC: 166 MG/DL
WBC # FLD AUTO: 7.85 K/UL

## 2023-12-08 PROCEDURE — 36415 COLL VENOUS BLD VENIPUNCTURE: CPT

## 2023-12-09 LAB
APPEARANCE: CLEAR
BACTERIA: NEGATIVE /HPF
BILIRUBIN URINE: NEGATIVE
BLOOD URINE: NEGATIVE
CAST: 0 /LPF
COLOR: YELLOW
EPITHELIAL CELLS: 2 /HPF
GLUCOSE QUALITATIVE U: NEGATIVE MG/DL
KETONES URINE: NEGATIVE MG/DL
LEUKOCYTE ESTERASE URINE: NEGATIVE
MICROSCOPIC-UA: NORMAL
NITRITE URINE: NEGATIVE
PH URINE: 5.5
PROTEIN URINE: NEGATIVE MG/DL
RED BLOOD CELLS URINE: 2 /HPF
SPECIFIC GRAVITY URINE: 1.02
UROBILINOGEN URINE: 0.2 MG/DL
WHITE BLOOD CELLS URINE: 2 /HPF

## 2023-12-12 LAB — TSH SERPL-ACNC: 2.15 UIU/ML

## 2023-12-13 ENCOUNTER — APPOINTMENT (OUTPATIENT)
Dept: INTERNAL MEDICINE | Facility: CLINIC | Age: 58
End: 2023-12-13
Payer: COMMERCIAL

## 2023-12-13 ENCOUNTER — NON-APPOINTMENT (OUTPATIENT)
Age: 58
End: 2023-12-13

## 2023-12-13 VITALS
SYSTOLIC BLOOD PRESSURE: 159 MMHG | OXYGEN SATURATION: 98 % | TEMPERATURE: 97.9 F | DIASTOLIC BLOOD PRESSURE: 83 MMHG | HEART RATE: 71 BPM | WEIGHT: 151 LBS | RESPIRATION RATE: 15 BRPM | BODY MASS INDEX: 25.78 KG/M2 | HEIGHT: 64 IN

## 2023-12-13 DIAGNOSIS — F51.02 ADJUSTMENT INSOMNIA: ICD-10-CM

## 2023-12-13 PROCEDURE — 99213 OFFICE O/P EST LOW 20 MIN: CPT | Mod: 25

## 2023-12-13 PROCEDURE — 93000 ELECTROCARDIOGRAM COMPLETE: CPT

## 2023-12-13 PROCEDURE — 99396 PREV VISIT EST AGE 40-64: CPT | Mod: 25

## 2023-12-13 RX ORDER — LISINOPRIL 5 MG/1
5 TABLET ORAL
Qty: 90 | Refills: 1 | Status: DISCONTINUED | COMMUNITY
Start: 2020-09-29 | End: 2023-12-13

## 2023-12-13 NOTE — PHYSICAL EXAM
[No Acute Distress] : no acute distress [Normal Sclera/Conjunctiva] : normal sclera/conjunctiva [Normal Outer Ear/Nose] : the outer ears and nose were normal in appearance [No JVD] : no jugular venous distention [No Edema] : there was no peripheral edema [Normal] : soft, non-tender, non-distended, no masses palpated, no HSM and normal bowel sounds [Normal Anterior Cervical Nodes] : no anterior cervical lymphadenopathy [No CVA Tenderness] : no CVA  tenderness [No Rash] : no rash [Coordination Grossly Intact] : coordination grossly intact [No Focal Deficits] : no focal deficits [Alert and Oriented x3] : oriented to person, place, and time

## 2023-12-13 NOTE — HEALTH RISK ASSESSMENT
[No] : No [Little interest or pleasure doing things] : 1) Little interest or pleasure doing things [Feeling down, depressed, or hopeless] : 2) Feeling down, depressed, or hopeless [0] : 2) Feeling down, depressed, or hopeless: Not at all (0) [PHQ-2 Negative - No further assessment needed] : PHQ-2 Negative - No further assessment needed [Patient reported mammogram was normal] : Patient reported mammogram was normal [Patient reported colonoscopy was normal] : Patient reported colonoscopy was normal [With Family] : lives with family [Unemployed] : unemployed [] :  [# Of Children ___] : has [unfilled] children [Feels Safe at Home] : Feels safe at home [Never] : Never [Sexually Active] : not sexually active [High Risk Behavior] : no high risk behavior [MammogramDate] : 09/2023 [ColonoscopyDate] : 2020

## 2023-12-13 NOTE — HISTORY OF PRESENT ILLNESS
[de-identified] : CC OF R HIP AND R LUMBAR PAIN .WORSE IN AM ,FOR MONTHS TO YEARS  CC OF RECURRENT RSCP AND UPPER BACK PAIN FOR 30 Y ;SEEN BY GI ,CARDIO AND BREAST SURGEON FOR IT  CC OF ABD BLOATING AND PRESSURE ON /OFF  CC OF ANXIETY AND INSOMNIA SEEN BY PSYCH DR DVEI ON CLONAZEPAM Q AM AND MIRTAZAPINE 7.5 AT HS  ALREADY HAVE INFLUENZA AND COVID VACCINES  PT HAD STOP ROSUVASTATIN MONTHS AGO

## 2023-12-13 NOTE — ASSESSMENT
[FreeTextEntry1] : CPE OF 58 Y OLD FEM  WITH PMX OF DYSLIPIDEMIA = RESUME ROSUVA 5 MG DAILY  IFG = LOW CARB DIET  DONALDO = F/U PSYCH  R HIP- LOWER BACK PAIN = ORTHO EVAL  ATYP CHEST PAIN AND ? IBS = GI EVAL  RTO 3-4 M

## 2023-12-13 NOTE — HISTORY OF PRESENT ILLNESS
[de-identified] : CC OF R HIP AND R LUMBAR PAIN .WORSE IN AM ,FOR MONTHS TO YEARS  CC OF RECURRENT RSCP AND UPPER BACK PAIN FOR 30 Y ;SEEN BY GI ,CARDIO AND BREAST SURGEON FOR IT  CC OF ABD BLOATING AND PRESSURE ON /OFF  CC OF ANXIETY AND INSOMNIA SEEN BY PSYCH DR DEVI ON CLONAZEPAM Q AM AND MIRTAZAPINE 7.5 AT HS  ALREADY HAVE INFLUENZA AND COVID VACCINES  PT HAD STOP ROSUVASTATIN MONTHS AGO

## 2023-12-13 NOTE — REVIEW OF SYSTEMS
[Fatigue] : fatigue [Chest Pain] : chest pain [Abdominal Pain] : abdominal pain [Joint Pain] : joint pain [Muscle Pain] : muscle pain [Back Pain] : back pain [Insomnia] : insomnia [Anxiety] : anxiety [Negative] : Heme/Lymph

## 2023-12-18 ENCOUNTER — APPOINTMENT (OUTPATIENT)
Dept: HEART AND VASCULAR | Facility: CLINIC | Age: 58
End: 2023-12-18
Payer: COMMERCIAL

## 2023-12-18 VITALS
SYSTOLIC BLOOD PRESSURE: 157 MMHG | RESPIRATION RATE: 15 BRPM | TEMPERATURE: 98 F | HEART RATE: 97 BPM | BODY MASS INDEX: 25.78 KG/M2 | DIASTOLIC BLOOD PRESSURE: 92 MMHG | HEIGHT: 64 IN | OXYGEN SATURATION: 96 % | WEIGHT: 151 LBS

## 2023-12-18 DIAGNOSIS — R00.2 PALPITATIONS: ICD-10-CM

## 2023-12-18 PROCEDURE — 99214 OFFICE O/P EST MOD 30 MIN: CPT | Mod: 25

## 2023-12-18 PROCEDURE — 93000 ELECTROCARDIOGRAM COMPLETE: CPT

## 2023-12-18 RX ORDER — MIRTAZAPINE 7.5 MG/1
7.5 TABLET, FILM COATED ORAL
Qty: 30 | Refills: 0 | Status: ACTIVE | COMMUNITY
Start: 2023-12-18 | End: 1900-01-01

## 2023-12-18 NOTE — HISTORY OF PRESENT ILLNESS
[FreeTextEntry1] : TORI NATARAJAN is a 58 year y.o. F with medical history significant for pre DM, anxiety with panic attacks, HTN She is here for follow-up.  The last time I saw her was May 2023 .  She reports having on and off palpitations for >1yr ; however, have worsened during the past week. Palpitations are associated with feeling anxiety, high BP and dull chest discomfort. She states she has been under a lot stress and anxiety.  BP is elevated today in the office, however, she states BP is normal at home. BP only increases at home when she is having anxiety.   Denies CP, SOB, orthopnea, dizziness, syncope, LH, MONAHAN, edema  Patient denies changes in her exercise tolerance during the past 6 months. She can walk 10 blocks and can climb 3 flights of stairs.  Sleeps with 1 pillow  She denies history of MI, CVA Denies family history of premature ASCVD and /or SCD  No hospitalizations in the past 3 years.   PMH HTN anxiety/panic attacks palpitations pre DM  ALL NKDA  FH grandfather with MI  SH no smoke, no etoh, no drugs    DATA  ECG (12/13/23): NSR at 67 bpm w nl axis and nonspecific TW abn  EKG 3/20/23: NSR at 67 bpm w nl axis and nonspecific TW abn  EKG 3/18/2022, sinus rhythm, nonspecific ST changes EKG 9/24/2019 SR, TWI V1-V3 EKG 10/1/2020 SB, diffuse ST depression, negative T waves EKG 10/14/2021 SR, non- specific T wave abnormalities  NUC stress test 10/10/2019 negative for ischemia, normal LVEF  Echocardiogram 4/20/22: LVEF 60%; Trace mitral insufficiency; Normal left ventricular size and function ECHO 11/6/2020: normal LV size and function (EF 60-65%)  HOLTER 1/21/2021 ( 7 days) : SB at night . 1 episode of three beats atach at 4 am (asymptomatic)   LABS (12/8/23):  TSH 2.15; Hgb 12.8; Hct 39.4; ; ; ; HDL 48; NonHDL 176; Cre 0.66; K 4.4; LFTs wnl; eGFR 102; A1c 6.1;  (3/20/23): K 4.1, CRE 0.58, LFTs wnl, EGFR 105, , , ; HDL 49, NON-, A1C 6.5, HCT 42.3,   LABS (9/12/22): ; ; HDL 49; ; NonHDL 191; K 4.8; Cre 0.59; LFTs wnl; eGFR 105; A1c 6.6 (6/2/22): ; ; ; HDL 40; NonHDL 158

## 2023-12-18 NOTE — REVIEW OF SYSTEMS
[Anxiety] : anxiety [Palpitations] : palpitations [Under Stress] : under stress [Fever] : no fever [Headache] : no headache [Chills] : no chills [Feeling Fatigued] : not feeling fatigued [SOB] : no shortness of breath [Dyspnea on exertion] : not dyspnea during exertion [Chest Discomfort] : no chest discomfort [Lower Ext Edema] : no extremity edema [Leg Claudication] : no intermittent leg claudication [Orthopnea] : no orthopnea [PND] : no PND [Syncope] : no syncope [Cough] : no cough [Wheezing] : no wheezing [Coughing Up Blood] : no hemoptysis [Dizziness] : no dizziness [Convulsions] : no convulsions [Limb Weakness (Paresis)] : no limb weakness (Paresis) [Confusion] : no confusion was observed [Suicidal] : not suicidal [Easy Bleeding] : no tendency for easy bleeding

## 2024-01-18 ENCOUNTER — APPOINTMENT (OUTPATIENT)
Dept: HEART AND VASCULAR | Facility: CLINIC | Age: 59
End: 2024-01-18
Payer: COMMERCIAL

## 2024-01-18 VITALS
DIASTOLIC BLOOD PRESSURE: 79 MMHG | BODY MASS INDEX: 26.29 KG/M2 | OXYGEN SATURATION: 99 % | HEIGHT: 64 IN | RESPIRATION RATE: 15 BRPM | HEART RATE: 72 BPM | WEIGHT: 154 LBS | SYSTOLIC BLOOD PRESSURE: 138 MMHG | TEMPERATURE: 98 F

## 2024-01-18 DIAGNOSIS — R94.31 ABNORMAL ELECTROCARDIOGRAM [ECG] [EKG]: ICD-10-CM

## 2024-01-18 PROCEDURE — 99214 OFFICE O/P EST MOD 30 MIN: CPT

## 2024-01-18 NOTE — REVIEW OF SYSTEMS
[Palpitations] : palpitations [Anxiety] : anxiety [Under Stress] : under stress [Fever] : no fever [Headache] : no headache [Chills] : no chills [Feeling Fatigued] : not feeling fatigued [SOB] : no shortness of breath [Dyspnea on exertion] : not dyspnea during exertion [Chest Discomfort] : no chest discomfort [Lower Ext Edema] : no extremity edema [Leg Claudication] : no intermittent leg claudication [Orthopnea] : no orthopnea [PND] : no PND [Syncope] : no syncope [Cough] : no cough [Wheezing] : no wheezing [Coughing Up Blood] : no hemoptysis [Dizziness] : no dizziness [Convulsions] : no convulsions [Limb Weakness (Paresis)] : no limb weakness (Paresis) [Confusion] : no confusion was observed [Suicidal] : not suicidal [Easy Bleeding] : no tendency for easy bleeding

## 2024-01-18 NOTE — ASSESSMENT
[FreeTextEntry1] : TORI NATARAJAN is a 58 year F with past medical history significant for pre DM, HLD, anxiety disorder with panic attacks, white coat HTN without diagnosis of HTN. She is here for follow-up of palpitations. Holter recorded episodes of PSVT. She reports good exercise tolerance. ECG with TW abn. No ischemia work-up in >3 yrs  - I reviewed ECG 3/20/23) --> No significant change from prior - I reviewed labs from 12/8/23 - I reviewed Holter report --> PSVT - start a trial of toprol 12.5 mg  - multiple risk factors and abn ECG --> schedule an ETT to evaluate for ischemia.  - Recommend to continue to monitor blood pressure at home and keep BP log to bring to next visit  - continue taking other meds  - Increase ambulation as tolerated to aim for approximately 30 minutes of moderate activity 5 days a week. Heart healthy diet low in sodium, sugars and saturated fats and high in fruits, vegetables and whole grains. Weight loss.   Patient advised to go to the nearest ED whenever any symptoms persist and/or worsens. Patient/Family/Caregiver verbalized complete understanding of these instructions.   I spent a total of 25 minutes with more than 50% spent on counseling and coordinating care.   RTO after test results is available.

## 2024-01-18 NOTE — HISTORY OF PRESENT ILLNESS
[FreeTextEntry1] : TORI NATARAJAN is a 58 year y.o. F with medical history significant for pre DM, anxiety with panic attacks, HTN She is here for follow-up of palpitations.   Holter recorded episodes of PSVT (longest 25 beats).   She reports having on and off palpitations for >1yr   She brings home BP readings that confirm normal BP.    Denies CP, SOB, orthopnea, dizziness, syncope, LH, MONAHAN, edema  Patient denies changes in her exercise tolerance during the past 6 months. She can walk 10 blocks and can climb 3 flights of stairs.  Sleeps with 1 pillow  She denies history of MI, CVA Denies family history of premature ASCVD and /or SCD  No hospitalizations in the past 3 years.   PMH HTN anxiety/panic attacks palpitations pre DM  ALL NKDA  FH grandfather with MI  SH no smoke, no etoh, no drugs    DATA  ECG (12/13/23): NSR at 67 bpm w nl axis and nonspecific TW abn  EKG 3/20/23: NSR at 67 bpm w nl axis and nonspecific TW abn  EKG 3/18/2022, sinus rhythm, nonspecific ST changes EKG 9/24/2019 SR, TWI V1-V3 EKG 10/1/2020 SB, diffuse ST depression, negative T waves EKG 10/14/2021 SR, non- specific T wave abnormalities  NUC stress test 10/10/2019 negative for ischemia, normal LVEF  Echocardiogram 4/20/22: LVEF 60%; Trace mitral insufficiency; Normal left ventricular size and function ECHO 11/6/2020: normal LV size and function (EF 60-65%)  HOLTER 1/21/2021 ( 7 days) : SB at night . 1 episode of three beats atach at 4 am (asymptomatic)   LABS (12/8/23):  TSH 2.15; Hgb 12.8; Hct 39.4; ; ; ; HDL 48; NonHDL 176; Cre 0.66; K 4.4; LFTs wnl; eGFR 102; A1c 6.1;  (3/20/23): K 4.1, CRE 0.58, LFTs wnl, EGFR 105, , , ; HDL 49, NON-, A1C 6.5, HCT 42.3,   LABS (9/12/22): ; ; HDL 49; ; NonHDL 191; K 4.8; Cre 0.59; LFTs wnl; eGFR 105; A1c 6.6 (6/2/22): ; ; ; HDL 40; NonHDL 158

## 2024-02-09 ENCOUNTER — OUTPATIENT (OUTPATIENT)
Dept: OUTPATIENT SERVICES | Facility: HOSPITAL | Age: 59
LOS: 1 days | End: 2024-02-09
Payer: COMMERCIAL

## 2024-02-09 DIAGNOSIS — R94.31 ABNORMAL ELECTROCARDIOGRAM [ECG] [EKG]: ICD-10-CM

## 2024-02-09 PROCEDURE — 93016 CV STRESS TEST SUPVJ ONLY: CPT

## 2024-02-09 PROCEDURE — 93017 CV STRESS TEST TRACING ONLY: CPT

## 2024-02-09 PROCEDURE — 93018 CV STRESS TEST I&R ONLY: CPT

## 2024-02-12 ENCOUNTER — APPOINTMENT (OUTPATIENT)
Dept: HEART AND VASCULAR | Facility: CLINIC | Age: 59
End: 2024-02-12
Payer: COMMERCIAL

## 2024-02-12 VITALS
HEIGHT: 64 IN | RESPIRATION RATE: 16 BRPM | OXYGEN SATURATION: 97 % | BODY MASS INDEX: 26.29 KG/M2 | SYSTOLIC BLOOD PRESSURE: 142 MMHG | TEMPERATURE: 98 F | WEIGHT: 154 LBS | HEART RATE: 71 BPM | DIASTOLIC BLOOD PRESSURE: 79 MMHG

## 2024-02-12 DIAGNOSIS — R94.39 ABNORMAL RESULT OF OTHER CARDIOVASCULAR FUNCTION STUDY: ICD-10-CM

## 2024-02-12 DIAGNOSIS — R73.01 IMPAIRED FASTING GLUCOSE: ICD-10-CM

## 2024-02-12 DIAGNOSIS — E78.5 HYPERLIPIDEMIA, UNSPECIFIED: ICD-10-CM

## 2024-02-12 PROCEDURE — 99214 OFFICE O/P EST MOD 30 MIN: CPT

## 2024-02-12 NOTE — HISTORY OF PRESENT ILLNESS
[FreeTextEntry1] : TORI NATARAJAN is a 58 year y.o. F with medical history significant for pre DM, anxiety with panic attacks, HTN, HLD She is here for follow-up of palpitations.   Holter recorded episodes of PSVT (longest 25 beats).   She reports having on and off palpitations for >1yr   ETT is positive for ischemia. She achieved 7.6 METS.   She brings home BP readings that confirm normal BP.    Denies CP, SOB, orthopnea, dizziness, syncope, LH, MONAHAN, edema  Patient denies changes in her exercise tolerance during the past 6 months. She can walk 10 blocks and can climb 3 flights of stairs.  Sleeps with 1 pillow  She denies history of MI, CVA Denies family history of premature ASCVD and /or SCD  No hospitalizations in the past 3 years.   PMH HTN anxiety/panic attacks palpitations pre DM  ALL NKDA  FH grandfather with MI  SH no smoke, no etoh, no drugs    DATA  ECG (12/13/23): NSR at 67 bpm w nl axis and nonspecific TW abn  EKG 3/20/23: NSR at 67 bpm w nl axis and nonspecific TW abn  EKG 3/18/2022, sinus rhythm, nonspecific ST changes EKG 9/24/2019 SR, TWI V1-V3 EKG 10/1/2020 SB, diffuse ST depression, negative T waves EKG 10/14/2021 SR, non- specific T wave abnormalities  NUC stress test 10/10/2019 negative for ischemia, normal LVEF  Echocardiogram 4/20/22: LVEF 60%; Trace mitral insufficiency; Normal left ventricular size and function ECHO 11/6/2020: normal LV size and function (EF 60-65%)  Holter x 14d (1/1/24): Min 44 bpm, Avg 64 bpm, Max 134 bpm. PSVT x 21 (longest 25 beats) and no VT or pauses HOLTER 1/21/2021 ( 7 days) : SB at night . 1 episode of three beats atach at 4 am (asymptomatic)   LABS (12/8/23):  TSH 2.15; Hgb 12.8; Hct 39.4; ; ; ; HDL 48; NonHDL 176; Cre 0.66; K 4.4; LFTs wnl; eGFR 102; A1c 6.1;  (3/20/23): K 4.1, CRE 0.58, LFTs wnl, EGFR 105, , , ; HDL 49, NON-, A1C 6.5, HCT 42.3,   LABS (9/12/22): ; ; HDL 49; ; NonHDL 191; K 4.8; Cre 0.59; LFTs wnl; eGFR 105; A1c 6.6 (6/2/22): ; ; ; HDL 40; NonHDL 158  ETT (2/9/24) 6.13 min of Juan protocol and achieved 91% of mphr. METS 7.6.  ECG is positive for ischemia w 1 mm horizontal and upsloping ST depression in leads II, III, AVF and V3-V6 that start ed at 2:50 min into exercise that resolved at 1:50 min into recovery. Encinas treadmill score is 1+, which is c/w intermediate risk (>=5) for future CV event.

## 2024-02-12 NOTE — ASSESSMENT
[FreeTextEntry1] : TORI NATARAJAN is a 58 year F with past medical history significant for pre DM, HLD, PSVT (on BB), anxiety disorder with panic attacks, white coat HTN without diagnosis of HTN. She is here for follow-up. ETT is positive for ischemia. She achieved below age exercise capacity, 7.6 METS.   - I reviewed ECG 12/13/23 - I reviewed labs from 12/8/23 - I reviewed Holter report --> PSVT - I reviewed ETT report  - increase toprol to 25 mg  - continue taking other cardiac meds  - she has multiple cardiac risk factors and abn ETT --> schedule a CCTA to evaluate for obstructive CAD  - Recommend to continue to monitor blood pressure at home and keep BP log to bring to next visit  - continue taking other meds  - Increase ambulation as tolerated to aim for approximately 30 minutes of moderate activity 5 days a week. Heart healthy diet low in sodium, sugars and saturated fats and high in fruits, vegetables and whole grains. Weight loss.   Patient advised to go to the nearest ED whenever any symptoms persist and/or worsens. Patient/Family/Caregiver verbalized complete understanding of these instructions.   I spent a total of 25 minutes with more than 50% spent on counseling and coordinating care.   RTO after test results are available.

## 2024-03-04 ENCOUNTER — APPOINTMENT (OUTPATIENT)
Dept: HEART AND VASCULAR | Facility: CLINIC | Age: 59
End: 2024-03-04
Payer: COMMERCIAL

## 2024-03-04 VITALS
BODY MASS INDEX: 26.29 KG/M2 | HEART RATE: 86 BPM | RESPIRATION RATE: 16 BRPM | WEIGHT: 154 LBS | SYSTOLIC BLOOD PRESSURE: 153 MMHG | DIASTOLIC BLOOD PRESSURE: 90 MMHG | HEIGHT: 64 IN | OXYGEN SATURATION: 98 %

## 2024-03-04 DIAGNOSIS — R03.0 ELEVATED BLOOD-PRESSURE READING, W/OUT DIAGNOSIS OF HYPERTENSION: ICD-10-CM

## 2024-03-04 DIAGNOSIS — E78.5 HYPERLIPIDEMIA, UNSPECIFIED: ICD-10-CM

## 2024-03-04 DIAGNOSIS — I25.10 ATHEROSCLEROTIC HEART DISEASE OF NATIVE CORONARY ARTERY W/OUT ANGINA PECTORIS: ICD-10-CM

## 2024-03-04 DIAGNOSIS — I10 ESSENTIAL (PRIMARY) HYPERTENSION: ICD-10-CM

## 2024-03-04 DIAGNOSIS — I47.10 SUPRAVENTRICULAR TACHYCARDIA, UNSPECIFIED: ICD-10-CM

## 2024-03-04 DIAGNOSIS — F41.1 GENERALIZED ANXIETY DISORDER: ICD-10-CM

## 2024-03-04 PROCEDURE — G2211 COMPLEX E/M VISIT ADD ON: CPT

## 2024-03-04 PROCEDURE — 99214 OFFICE O/P EST MOD 30 MIN: CPT

## 2024-03-04 RX ORDER — METOPROLOL SUCCINATE 25 MG/1
25 TABLET, EXTENDED RELEASE ORAL
Qty: 90 | Refills: 1 | Status: ACTIVE | COMMUNITY
Start: 2024-01-18 | End: 1900-01-01

## 2024-03-04 RX ORDER — ROSUVASTATIN CALCIUM 10 MG/1
10 TABLET, FILM COATED ORAL
Qty: 90 | Refills: 1 | Status: ACTIVE | COMMUNITY
Start: 2022-12-12 | End: 1900-01-01

## 2024-03-04 NOTE — PHYSICAL EXAM
[Well Developed] : well developed [Well Nourished] : well nourished [No Acute Distress] : no acute distress [Normal Venous Pressure] : normal venous pressure [No Carotid Bruit] : no carotid bruit [Normal S1, S2] : normal S1, S2 [No Murmur] : no murmur [No Rub] : no rub [No Gallop] : no gallop [Clear Lung Fields] : clear lung fields [No Respiratory Distress] : no respiratory distress  [Good Air Entry] : good air entry [Normal Gait] : normal gait [No Edema] : no edema [No Cyanosis] : no cyanosis [No Clubbing] : no clubbing [Moves all extremities] : moves all extremities [No Focal Deficits] : no focal deficits [Normal Speech] : normal speech [Alert and Oriented] : alert and oriented [Normal memory] : normal memory

## 2024-03-04 NOTE — REVIEW OF SYSTEMS
[Anxiety] : anxiety [Under Stress] : under stress [Fever] : no fever [Chills] : no chills [Headache] : no headache [Feeling Fatigued] : not feeling fatigued [Dyspnea on exertion] : not dyspnea during exertion [SOB] : no shortness of breath [Chest Discomfort] : no chest discomfort [Leg Claudication] : no intermittent leg claudication [Lower Ext Edema] : no extremity edema [Orthopnea] : no orthopnea [Palpitations] : no palpitations [PND] : no PND [Cough] : no cough [Syncope] : no syncope [Wheezing] : no wheezing [Coughing Up Blood] : no hemoptysis [Dizziness] : no dizziness [Convulsions] : no convulsions [Limb Weakness (Paresis)] : no limb weakness (Paresis) [Confusion] : no confusion was observed [Suicidal] : not suicidal [Easy Bleeding] : no tendency for easy bleeding

## 2024-03-04 NOTE — ASSESSMENT
[FreeTextEntry1] : TORI NATARAJAN is a 58 year F with past medical history significant for pre DM, HLD, PSVT (on BB), anxiety disorder with panic attacks, white coat HTN without diagnosis of HTN. She is here for follow-up. ETT is positive for ischemia. She achieved below age exercise capacity, 7.6 METS. CCTA revealed mild non-obstructive CAD calcium score 32.   - I reviewed ECG 12/13/23 - I reviewed labs from 12/8/23 - I reviewed Holter report --> PSVT - I reviewed ETT report  - I reviewed CCTA  - increase rosuvastatin to 10 mg  - start ASA 81 mg daily  - continue taking other cardiac meds (metoprolol) - Recommend to continue to monitor blood pressure at home and keep BP log to bring to next visit  - continue taking other meds - I counseled her on stress management.   - Increase ambulation as tolerated to aim for approximately 30 minutes of moderate activity 5 days a week. Heart healthy diet low in sodium, sugars and saturated fats and high in fruits, vegetables and whole grains. Weight loss.   Patient advised to go to the nearest ED whenever any symptoms persist and/or worsens. Patient/Family/Caregiver verbalized complete understanding of these instructions.   I spent a total of 25 minutes with more than 50% spent on counseling and coordinating care.   RTO in 4 mo

## 2024-03-04 NOTE — HISTORY OF PRESENT ILLNESS
[FreeTextEntry1] : TORI NATARAJAN is a 59 year y.o. F with medical history significant for pre- DM, anxiety with panic attacks, HTN, HLD, PSVT. She is here for follow-up of palpitations.   Holter recorded episodes of PSVT (longest 25 beats).  metoprolol was increased to 25 mg   ETT is positive for ischemia. She achieved 7.6 METS. CCTA revealed mild non-obstructive CAD. Calcium score = 32.    She brings home BP readings that confirm normal BP.  She has a component of white coat.   Denies CP, SOB, orthopnea, dizziness, syncope, LH, MONAHAN, edema.  Patient denies changes in her exercise tolerance during the past 6 months. She can walk 10 blocks and can climb 3 flights of stairs.  Sleeps with 1 pillow  She denies history of MI, CVA Denies family history of premature ASCVD and /or SCD  No hospitalizations in the past 3 years.   PMH HTN anxiety/panic attacks palpitations pre DM  ALL NKDA  FH grandfather with MI  SH no smoke, no etoh, no drugs   DATA  ECG (12/13/23): NSR at 67 bpm w nl axis and nonspecific TW abn  EKG 3/20/23: NSR at 67 bpm w nl axis and nonspecific TW abn  EKG 3/18/2022, sinus rhythm, nonspecific ST changes EKG 9/24/2019 SR, TWI V1-V3 EKG 10/1/2020 SB, diffuse ST depression, negative T waves EKG 10/14/2021 SR, non- specific T wave abnormalities  NUC stress test 10/10/2019 negative for ischemia, normal LVEF  Echocardiogram 4/20/22: LVEF 60%; Trace mitral insufficiency; Normal left ventricular size and function ECHO 11/6/2020: normal LV size and function (EF 60-65%)  Holter x 14d (1/1/24): Min 44 bpm, Avg 64 bpm, Max 134 bpm. PSVT x 21 (longest 25 beats) and no VT or pauses HOLTER 1/21/2021 ( 7 days) : SB at night . 1 episode of three beats atach at 4 am (asymptomatic)   LABS (12/8/23):  TSH 2.15; Hgb 12.8; Hct 39.4; ; ; ; HDL 48; NonHDL 176; Cre 0.66; K 4.4; LFTs wnl; eGFR 102; A1c 6.1;  (3/20/23): K 4.1, CRE 0.58, LFTs wnl, EGFR 105, , , ; HDL 49, NON-, A1C 6.5, HCT 42.3,   LABS (9/12/22): ; ; HDL 49; ; NonHDL 191; K 4.8; Cre 0.59; LFTs wnl; eGFR 105; A1c 6.6 (6/2/22): ; ; ; HDL 40; NonHDL 158  ETT (2/9/24) 6.13 min of Juan protocol and achieved 91% of mphr. METS 7.6.  ECG is positive for ischemia w 1 mm horizontal and upsloping ST depression in leads II, III, AVF and V3-V6 that start ed at 2:50 min into exercise that resolved at 1:50 min into recovery. Encinas treadmill score is 1+, which is c/w intermediate risk (>=5) for future CV event.   CCTA (2/28/24) Right dominant coronary arteries with nonobstructive calcified plaque in the proximal LAD resulting in less than 35% stenosis, the remainder of the coronary arteries are normal without plaque or stenosis. Mild burden of coronary calcium, with a score of 32, placing the patient into the 89% of coronary artery disease in comparison to asymptomatic patient's of similar age and gender. Normal cardiac structural morphology, Normal imaged thoracic aorta, pulmonary vasculature and systemic veins. No contributory Non-cardiovascular findings.

## 2024-10-28 ENCOUNTER — NON-APPOINTMENT (OUTPATIENT)
Age: 59
End: 2024-10-28

## 2024-10-28 ENCOUNTER — APPOINTMENT (OUTPATIENT)
Age: 59
End: 2024-10-28
Payer: COMMERCIAL

## 2024-10-28 VITALS
DIASTOLIC BLOOD PRESSURE: 79 MMHG | RESPIRATION RATE: 16 BRPM | HEIGHT: 64 IN | OXYGEN SATURATION: 97 % | TEMPERATURE: 97.4 F | BODY MASS INDEX: 25.1 KG/M2 | WEIGHT: 147 LBS | HEART RATE: 76 BPM | SYSTOLIC BLOOD PRESSURE: 126 MMHG

## 2024-10-28 DIAGNOSIS — R94.31 ABNORMAL ELECTROCARDIOGRAM [ECG] [EKG]: ICD-10-CM

## 2024-10-28 DIAGNOSIS — R73.01 IMPAIRED FASTING GLUCOSE: ICD-10-CM

## 2024-10-28 DIAGNOSIS — I47.10 SUPRAVENTRICULAR TACHYCARDIA, UNSPECIFIED: ICD-10-CM

## 2024-10-28 DIAGNOSIS — I25.10 ATHEROSCLEROTIC HEART DISEASE OF NATIVE CORONARY ARTERY W/OUT ANGINA PECTORIS: ICD-10-CM

## 2024-10-28 DIAGNOSIS — I10 ESSENTIAL (PRIMARY) HYPERTENSION: ICD-10-CM

## 2024-10-28 DIAGNOSIS — E78.5 HYPERLIPIDEMIA, UNSPECIFIED: ICD-10-CM

## 2024-10-28 PROCEDURE — 99214 OFFICE O/P EST MOD 30 MIN: CPT | Mod: 25

## 2024-10-28 PROCEDURE — 93000 ELECTROCARDIOGRAM COMPLETE: CPT

## 2024-12-12 ENCOUNTER — APPOINTMENT (OUTPATIENT)
Age: 59
End: 2024-12-12

## 2024-12-16 ENCOUNTER — APPOINTMENT (OUTPATIENT)
Age: 59
End: 2024-12-16
Payer: MEDICAID

## 2024-12-16 VITALS
WEIGHT: 149 LBS | RESPIRATION RATE: 14 BRPM | DIASTOLIC BLOOD PRESSURE: 84 MMHG | OXYGEN SATURATION: 97 % | HEART RATE: 86 BPM | SYSTOLIC BLOOD PRESSURE: 138 MMHG | BODY MASS INDEX: 25.44 KG/M2 | HEIGHT: 64 IN | TEMPERATURE: 98.1 F

## 2024-12-16 DIAGNOSIS — R73.01 IMPAIRED FASTING GLUCOSE: ICD-10-CM

## 2024-12-16 DIAGNOSIS — Z00.00 ENCOUNTER FOR GENERAL ADULT MEDICAL EXAMINATION W/OUT ABNORMAL FINDINGS: ICD-10-CM

## 2024-12-16 PROCEDURE — 99396 PREV VISIT EST AGE 40-64: CPT

## 2024-12-16 PROCEDURE — 99214 OFFICE O/P EST MOD 30 MIN: CPT | Mod: 25

## 2025-01-29 ENCOUNTER — APPOINTMENT (OUTPATIENT)
Age: 60
End: 2025-01-29
Payer: MEDICAID

## 2025-01-29 VITALS
WEIGHT: 149 LBS | SYSTOLIC BLOOD PRESSURE: 154 MMHG | BODY MASS INDEX: 25.44 KG/M2 | HEART RATE: 90 BPM | DIASTOLIC BLOOD PRESSURE: 84 MMHG | TEMPERATURE: 97.6 F | HEIGHT: 64 IN | RESPIRATION RATE: 15 BRPM | OXYGEN SATURATION: 96 %

## 2025-01-29 DIAGNOSIS — I10 ESSENTIAL (PRIMARY) HYPERTENSION: ICD-10-CM

## 2025-01-29 DIAGNOSIS — E78.5 HYPERLIPIDEMIA, UNSPECIFIED: ICD-10-CM

## 2025-01-29 DIAGNOSIS — M54.9 DORSALGIA, UNSPECIFIED: ICD-10-CM

## 2025-01-29 DIAGNOSIS — R20.2 PARESTHESIA OF SKIN: ICD-10-CM

## 2025-01-29 DIAGNOSIS — M62.830 MUSCLE SPASM OF BACK: ICD-10-CM

## 2025-01-29 PROCEDURE — 99214 OFFICE O/P EST MOD 30 MIN: CPT

## 2025-01-29 RX ORDER — MELOXICAM 15 MG/1
15 TABLET ORAL DAILY
Qty: 20 | Refills: 0 | Status: ACTIVE | COMMUNITY
Start: 2025-01-29 | End: 1900-01-01

## 2025-01-29 RX ORDER — CYCLOBENZAPRINE HYDROCHLORIDE 10 MG/1
10 TABLET, FILM COATED ORAL
Qty: 20 | Refills: 0 | Status: ACTIVE | COMMUNITY
Start: 2025-01-29 | End: 1900-01-01

## 2025-01-30 LAB
ALBUMIN SERPL ELPH-MCNC: 4.8 G/DL
ALP BLD-CCNC: 114 U/L
ALT SERPL-CCNC: 18 U/L
ANION GAP SERPL CALC-SCNC: 13 MMOL/L
AST SERPL-CCNC: 18 U/L
BILIRUB SERPL-MCNC: 0.4 MG/DL
BUN SERPL-MCNC: 14 MG/DL
CALCIUM SERPL-MCNC: 9.5 MG/DL
CHLORIDE SERPL-SCNC: 104 MMOL/L
CK SERPL-CCNC: 92 U/L
CO2 SERPL-SCNC: 24 MMOL/L
CREAT SERPL-MCNC: 0.56 MG/DL
EGFR: 105 ML/MIN/1.73M2
ERYTHROCYTE [SEDIMENTATION RATE] IN BLOOD BY WESTERGREN METHOD: 32 MM/HR
GLUCOSE SERPL-MCNC: 186 MG/DL
MAGNESIUM SERPL-MCNC: 2.2 MG/DL
POTASSIUM SERPL-SCNC: 4.3 MMOL/L
PROT SERPL-MCNC: 7.7 G/DL
SODIUM SERPL-SCNC: 141 MMOL/L

## 2025-01-31 ENCOUNTER — APPOINTMENT (OUTPATIENT)
Dept: ORTHOPEDIC SURGERY | Facility: CLINIC | Age: 60
End: 2025-01-31
Payer: MEDICAID

## 2025-01-31 VITALS — BODY MASS INDEX: 25.44 KG/M2 | HEIGHT: 64 IN | WEIGHT: 149 LBS

## 2025-01-31 DIAGNOSIS — M54.16 RADICULOPATHY, LUMBAR REGION: ICD-10-CM

## 2025-01-31 DIAGNOSIS — Z78.9 OTHER SPECIFIED HEALTH STATUS: ICD-10-CM

## 2025-01-31 PROCEDURE — 99204 OFFICE O/P NEW MOD 45 MIN: CPT

## 2025-01-31 PROCEDURE — 72110 X-RAY EXAM L-2 SPINE 4/>VWS: CPT

## 2025-01-31 RX ORDER — GABAPENTIN 300 MG/1
300 CAPSULE ORAL 3 TIMES DAILY
Qty: 90 | Refills: 0 | Status: ACTIVE | COMMUNITY
Start: 2025-01-31 | End: 1900-01-01

## 2025-01-31 RX ORDER — METHYLPREDNISOLONE 4 MG/1
4 TABLET ORAL
Qty: 1 | Refills: 0 | Status: ACTIVE | COMMUNITY
Start: 2025-01-31 | End: 1900-01-01

## 2025-02-14 ENCOUNTER — APPOINTMENT (OUTPATIENT)
Dept: ORTHOPEDIC SURGERY | Facility: CLINIC | Age: 60
End: 2025-02-14
Payer: MEDICAID

## 2025-02-14 DIAGNOSIS — M54.9 DORSALGIA, UNSPECIFIED: ICD-10-CM

## 2025-02-14 DIAGNOSIS — M54.16 RADICULOPATHY, LUMBAR REGION: ICD-10-CM

## 2025-02-14 PROCEDURE — 99214 OFFICE O/P EST MOD 30 MIN: CPT

## 2025-02-14 RX ORDER — CYCLOBENZAPRINE HYDROCHLORIDE 5 MG/1
5 TABLET, FILM COATED ORAL 3 TIMES DAILY
Qty: 42 | Refills: 1 | Status: ACTIVE | COMMUNITY
Start: 2025-02-14 | End: 1900-01-01

## 2025-02-14 RX ORDER — MELOXICAM 15 MG/1
15 TABLET ORAL
Qty: 30 | Refills: 2 | Status: ACTIVE | COMMUNITY
Start: 2025-02-14 | End: 1900-01-01

## 2025-02-26 ENCOUNTER — OUTPATIENT (OUTPATIENT)
Dept: OUTPATIENT SERVICES | Facility: HOSPITAL | Age: 60
LOS: 1 days | End: 2025-02-26
Payer: MEDICAID

## 2025-02-26 ENCOUNTER — APPOINTMENT (OUTPATIENT)
Dept: OBGYN | Facility: CLINIC | Age: 60
End: 2025-02-26

## 2025-02-26 VITALS
HEART RATE: 79 BPM | TEMPERATURE: 97.7 F | SYSTOLIC BLOOD PRESSURE: 146 MMHG | RESPIRATION RATE: 18 BRPM | BODY MASS INDEX: 25.95 KG/M2 | DIASTOLIC BLOOD PRESSURE: 81 MMHG | OXYGEN SATURATION: 98 % | WEIGHT: 152 LBS | HEIGHT: 64 IN

## 2025-02-26 DIAGNOSIS — M85.80 OTHER SPECIFIED DISORDERS OF BONE DENSITY AND STRUCTURE, UNSPECIFIED SITE: ICD-10-CM

## 2025-02-26 DIAGNOSIS — Z01.419 ENCOUNTER FOR GYNECOLOGICAL EXAMINATION (GENERAL) (ROUTINE) W/OUT ABNORMAL FINDINGS: ICD-10-CM

## 2025-02-26 DIAGNOSIS — Z00.00 ENCOUNTER FOR GENERAL ADULT MEDICAL EXAMINATION WITHOUT ABNORMAL FINDINGS: ICD-10-CM

## 2025-02-26 PROCEDURE — G0444 DEPRESSION SCREEN ANNUAL: CPT

## 2025-02-26 PROCEDURE — 99203 OFFICE O/P NEW LOW 30 MIN: CPT

## 2025-02-26 PROCEDURE — G0463: CPT

## 2025-02-27 ENCOUNTER — APPOINTMENT (OUTPATIENT)
Dept: RADIOLOGY | Facility: CLINIC | Age: 60
End: 2025-02-27
Payer: MEDICAID

## 2025-02-27 LAB
C TRACH RRNA SPEC QL NAA+PROBE: NOT DETECTED
HPV HIGH+LOW RISK DNA PNL CVX: NOT DETECTED
N GONORRHOEA RRNA SPEC QL NAA+PROBE: NOT DETECTED
SOURCE TP AMPLIFICATION: NORMAL

## 2025-02-27 PROCEDURE — 77085 DXA BONE DENSITY AXL VRT FX: CPT

## 2025-03-02 LAB — CYTOLOGY CVX/VAG DOC THIN PREP: ABNORMAL

## 2025-03-04 DIAGNOSIS — Z01.419 ENCOUNTER FOR GYNECOLOGICAL EXAMINATION (GENERAL) (ROUTINE) WITHOUT ABNORMAL FINDINGS: ICD-10-CM

## 2025-03-06 ENCOUNTER — APPOINTMENT (OUTPATIENT)
Dept: PHYSICAL MEDICINE AND REHAB | Facility: CLINIC | Age: 60
End: 2025-03-06
Payer: MEDICAID

## 2025-03-06 ENCOUNTER — NON-APPOINTMENT (OUTPATIENT)
Age: 60
End: 2025-03-06

## 2025-03-06 VITALS
HEIGHT: 64 IN | WEIGHT: 151 LBS | BODY MASS INDEX: 25.78 KG/M2 | SYSTOLIC BLOOD PRESSURE: 139 MMHG | HEART RATE: 80 BPM | DIASTOLIC BLOOD PRESSURE: 80 MMHG

## 2025-03-06 DIAGNOSIS — R20.2 PARESTHESIA OF SKIN: ICD-10-CM

## 2025-03-06 DIAGNOSIS — Z78.9 OTHER SPECIFIED HEALTH STATUS: ICD-10-CM

## 2025-03-06 DIAGNOSIS — R20.0 ANESTHESIA OF SKIN: ICD-10-CM

## 2025-03-06 DIAGNOSIS — M19.049 PRIMARY OSTEOARTHRITIS, UNSPECIFIED HAND: ICD-10-CM

## 2025-03-06 DIAGNOSIS — R52 PAIN, UNSPECIFIED: ICD-10-CM

## 2025-03-06 PROCEDURE — 99203 OFFICE O/P NEW LOW 30 MIN: CPT

## 2025-03-12 ENCOUNTER — APPOINTMENT (OUTPATIENT)
Dept: PHYSICAL MEDICINE AND REHAB | Facility: CLINIC | Age: 60
End: 2025-03-12
Payer: MEDICAID

## 2025-03-12 VITALS
HEART RATE: 72 BPM | RESPIRATION RATE: 18 BRPM | SYSTOLIC BLOOD PRESSURE: 126 MMHG | HEIGHT: 64 IN | WEIGHT: 150 LBS | OXYGEN SATURATION: 98 % | DIASTOLIC BLOOD PRESSURE: 82 MMHG | BODY MASS INDEX: 25.61 KG/M2

## 2025-03-12 DIAGNOSIS — G56.03 CARPAL TUNNEL SYNDROM,BILATERAL UPPER LIMBS: ICD-10-CM

## 2025-03-12 PROCEDURE — 95886 MUSC TEST DONE W/N TEST COMP: CPT

## 2025-03-12 PROCEDURE — 95912 NRV CNDJ TEST 11-12 STUDIES: CPT

## 2025-03-28 ENCOUNTER — APPOINTMENT (OUTPATIENT)
Dept: ORTHOPEDIC SURGERY | Facility: CLINIC | Age: 60
End: 2025-03-28
Payer: MEDICAID

## 2025-03-28 DIAGNOSIS — M54.9 DORSALGIA, UNSPECIFIED: ICD-10-CM

## 2025-03-28 DIAGNOSIS — G56.03 CARPAL TUNNEL SYNDROM,BILATERAL UPPER LIMBS: ICD-10-CM

## 2025-03-28 DIAGNOSIS — M54.2 CERVICALGIA: ICD-10-CM

## 2025-03-28 PROCEDURE — 99214 OFFICE O/P EST MOD 30 MIN: CPT

## 2025-03-28 RX ORDER — MELOXICAM 15 MG/1
15 TABLET ORAL
Qty: 30 | Refills: 1 | Status: ACTIVE | COMMUNITY
Start: 2025-03-28 | End: 1900-01-01

## 2025-03-28 RX ORDER — CYCLOBENZAPRINE HYDROCHLORIDE 5 MG/1
5 TABLET, FILM COATED ORAL 3 TIMES DAILY
Qty: 30 | Refills: 0 | Status: ACTIVE | COMMUNITY
Start: 2025-03-28 | End: 1900-01-01

## 2025-04-29 ENCOUNTER — APPOINTMENT (OUTPATIENT)
Dept: ORTHOPEDIC SURGERY | Facility: CLINIC | Age: 60
End: 2025-04-29
Payer: MEDICAID

## 2025-04-29 DIAGNOSIS — M54.9 DORSALGIA, UNSPECIFIED: ICD-10-CM

## 2025-04-29 DIAGNOSIS — G56.03 CARPAL TUNNEL SYNDROM,BILATERAL UPPER LIMBS: ICD-10-CM

## 2025-04-29 DIAGNOSIS — M54.16 RADICULOPATHY, LUMBAR REGION: ICD-10-CM

## 2025-04-29 DIAGNOSIS — M54.2 CERVICALGIA: ICD-10-CM

## 2025-04-29 PROCEDURE — 99214 OFFICE O/P EST MOD 30 MIN: CPT

## 2025-04-29 PROCEDURE — 72070 X-RAY EXAM THORAC SPINE 2VWS: CPT

## 2025-04-29 PROCEDURE — 72050 X-RAY EXAM NECK SPINE 4/5VWS: CPT

## 2025-04-29 RX ORDER — CYCLOBENZAPRINE HYDROCHLORIDE 5 MG/1
5 TABLET, FILM COATED ORAL
Qty: 90 | Refills: 0 | Status: ACTIVE | COMMUNITY
Start: 2025-04-29 | End: 1900-01-01

## 2025-04-30 PROBLEM — M54.9 UPPER BACK PAIN: Status: ACTIVE | Noted: 2025-04-30

## 2025-05-01 ENCOUNTER — APPOINTMENT (OUTPATIENT)
Age: 60
End: 2025-05-01

## 2025-05-01 PROCEDURE — 36415 COLL VENOUS BLD VENIPUNCTURE: CPT

## 2025-05-02 ENCOUNTER — APPOINTMENT (OUTPATIENT)
Age: 60
End: 2025-05-02
Payer: MEDICAID

## 2025-05-02 VITALS
HEART RATE: 87 BPM | WEIGHT: 148 LBS | OXYGEN SATURATION: 96 % | TEMPERATURE: 97.2 F | HEIGHT: 64 IN | SYSTOLIC BLOOD PRESSURE: 142 MMHG | RESPIRATION RATE: 14 BRPM | BODY MASS INDEX: 25.27 KG/M2 | DIASTOLIC BLOOD PRESSURE: 84 MMHG

## 2025-05-02 DIAGNOSIS — R73.01 IMPAIRED FASTING GLUCOSE: ICD-10-CM

## 2025-05-02 DIAGNOSIS — I10 ESSENTIAL (PRIMARY) HYPERTENSION: ICD-10-CM

## 2025-05-02 DIAGNOSIS — E78.5 HYPERLIPIDEMIA, UNSPECIFIED: ICD-10-CM

## 2025-05-02 DIAGNOSIS — M54.9 DORSALGIA, UNSPECIFIED: ICD-10-CM

## 2025-05-02 DIAGNOSIS — F41.1 GENERALIZED ANXIETY DISORDER: ICD-10-CM

## 2025-05-02 DIAGNOSIS — F51.02 ADJUSTMENT INSOMNIA: ICD-10-CM

## 2025-05-02 DIAGNOSIS — M85.80 OTHER SPECIFIED DISORDERS OF BONE DENSITY AND STRUCTURE, UNSPECIFIED SITE: ICD-10-CM

## 2025-05-02 DIAGNOSIS — R10.9 UNSPECIFIED ABDOMINAL PAIN: ICD-10-CM

## 2025-05-02 PROCEDURE — 99214 OFFICE O/P EST MOD 30 MIN: CPT

## 2025-05-27 ENCOUNTER — RX RENEWAL (OUTPATIENT)
Age: 60
End: 2025-05-27

## 2025-09-15 ENCOUNTER — APPOINTMENT (OUTPATIENT)
Age: 60
End: 2025-09-15
Payer: MEDICAID

## 2025-09-15 VITALS
HEART RATE: 69 BPM | RESPIRATION RATE: 16 BRPM | BODY MASS INDEX: 25.27 KG/M2 | HEIGHT: 64 IN | TEMPERATURE: 97 F | SYSTOLIC BLOOD PRESSURE: 134 MMHG | DIASTOLIC BLOOD PRESSURE: 87 MMHG | OXYGEN SATURATION: 98 % | WEIGHT: 148 LBS

## 2025-09-15 DIAGNOSIS — I47.10 SUPRAVENTRICULAR TACHYCARDIA, UNSPECIFIED: ICD-10-CM

## 2025-09-15 DIAGNOSIS — R94.31 ABNORMAL ELECTROCARDIOGRAM [ECG] [EKG]: ICD-10-CM

## 2025-09-15 DIAGNOSIS — E78.5 HYPERLIPIDEMIA, UNSPECIFIED: ICD-10-CM

## 2025-09-15 DIAGNOSIS — R73.03 PREDIABETES.: ICD-10-CM

## 2025-09-15 DIAGNOSIS — I25.10 ATHEROSCLEROTIC HEART DISEASE OF NATIVE CORONARY ARTERY W/OUT ANGINA PECTORIS: ICD-10-CM

## 2025-09-15 DIAGNOSIS — I10 ESSENTIAL (PRIMARY) HYPERTENSION: ICD-10-CM

## 2025-09-15 PROCEDURE — 99214 OFFICE O/P EST MOD 30 MIN: CPT | Mod: 25

## 2025-09-15 PROCEDURE — 93000 ELECTROCARDIOGRAM COMPLETE: CPT

## 2025-09-15 RX ORDER — EMPAGLIFLOZIN 10 MG/1
10 TABLET, FILM COATED ORAL DAILY
Qty: 1 | Refills: 1 | Status: ACTIVE | COMMUNITY
Start: 2025-09-15 | End: 1900-01-01

## 2025-09-15 RX ORDER — EZETIMIBE 10 MG/1
10 TABLET ORAL
Qty: 90 | Refills: 1 | Status: ACTIVE | COMMUNITY
Start: 2025-09-15 | End: 1900-01-01